# Patient Record
Sex: FEMALE | Race: BLACK OR AFRICAN AMERICAN | NOT HISPANIC OR LATINO | URBAN - METROPOLITAN AREA
[De-identification: names, ages, dates, MRNs, and addresses within clinical notes are randomized per-mention and may not be internally consistent; named-entity substitution may affect disease eponyms.]

---

## 2017-05-31 ENCOUNTER — EMERGENCY (EMERGENCY)
Facility: HOSPITAL | Age: 27
LOS: 1 days | Discharge: HOME | End: 2017-05-31
Attending: EMERGENCY MEDICINE | Admitting: EMERGENCY MEDICINE
Payer: MEDICAID

## 2017-05-31 VITALS
SYSTOLIC BLOOD PRESSURE: 130 MMHG | TEMPERATURE: 98 F | HEART RATE: 66 BPM | DIASTOLIC BLOOD PRESSURE: 71 MMHG | OXYGEN SATURATION: 100 % | RESPIRATION RATE: 15 BRPM | WEIGHT: 130.07 LBS

## 2017-05-31 LAB
APPEARANCE UR: SIGNIFICANT CHANGE UP
BACTERIA # UR AUTO: HIGH
BILIRUB UR-MCNC: NEGATIVE — SIGNIFICANT CHANGE UP
BLOOD UR QL VISUAL: NEGATIVE — SIGNIFICANT CHANGE UP
COLOR SPEC: SIGNIFICANT CHANGE UP
GLUCOSE UR-MCNC: NEGATIVE — SIGNIFICANT CHANGE UP
KETONES UR-MCNC: NEGATIVE — SIGNIFICANT CHANGE UP
LEUKOCYTE ESTERASE UR-ACNC: HIGH
NITRITE UR-MCNC: POSITIVE — HIGH
NON-SQ EPI CELLS # UR AUTO: <1 — SIGNIFICANT CHANGE UP
PH UR: 7.5 — SIGNIFICANT CHANGE UP (ref 4.6–8)
PROT UR-MCNC: 10 — SIGNIFICANT CHANGE UP
SP GR SPEC: 1.01 — SIGNIFICANT CHANGE UP (ref 1–1.03)
SQUAMOUS # UR AUTO: SIGNIFICANT CHANGE UP
UROBILINOGEN FLD QL: NORMAL E.U. — SIGNIFICANT CHANGE UP (ref 0.1–0.2)
WBC UR QL: SIGNIFICANT CHANGE UP (ref 0–?)

## 2017-05-31 PROCEDURE — 99284 EMERGENCY DEPT VISIT MOD MDM: CPT

## 2017-05-31 RX ORDER — CEFUROXIME AXETIL 250 MG
1 TABLET ORAL
Qty: 14 | Refills: 0
Start: 2017-05-31 | End: 2017-06-07

## 2017-05-31 RX ORDER — CEFUROXIME AXETIL 250 MG
1 TABLET ORAL
Qty: 14 | Refills: 0 | OUTPATIENT
Start: 2017-05-31 | End: 2017-06-07

## 2017-05-31 NOTE — ED PROVIDER NOTE - OBJECTIVE STATEMENT
27y F with no significant PMHx presents to the ED with urinary frequency for the past few days. Pt has hx of UTIs and states symptoms feel same. Pt c/o urinary frequency and odor. Reports feeling as though she has to urinate but only urinates a small amount. LMP: 4/15/17. Denies fever, chills, nausea, vomiting, diarrhea 27y F with no significant PMHx presents to the ED with urinary frequency for the past few days. Pt has hx of UTIs and states symptoms feel same. Pt c/o urinary frequency and odor. Reports feeling as though she has to urinate but only urinates a small amount. LMP: 4/15/17. Denies fever, chills, nausea, vomiting, diarrhea, or any other complaints.

## 2017-05-31 NOTE — ED PROVIDER NOTE - PROGRESS NOTE DETAILS
SURYA Lane pt urine + for leuks, nitrites. Dr. Barrera sent ceftin x 7 days to pharmacy will f/u culture in 48 hours. return precautions given. pt agrees and understands plan.

## 2017-05-31 NOTE — ED PROVIDER NOTE - CARE PLAN
Principal Discharge DX:	UTI (urinary tract infection)  Instructions for follow-up, activity and diet:	Rest, drink plenty of fluids.  Advance activity as tolerated.  Continue all previously prescribed medications as directed.  Follow up with your primary care physician in 48-72 hours- bring copies of your results. Take antibiotic Ceftin 250 mg twice a day for 7 days. Return to the Emergency Department for fevers,  worsening or persistent symptoms OR ANY NEW OR CONCERNING SYMPTOMS.

## 2017-05-31 NOTE — ED PROVIDER NOTE - PLAN OF CARE
Rest, drink plenty of fluids.  Advance activity as tolerated.  Continue all previously prescribed medications as directed.  Follow up with your primary care physician in 48-72 hours- bring copies of your results. Take antibiotic Ceftin 250 mg twice a day for 7 days. Return to the Emergency Department for fevers,  worsening or persistent symptoms OR ANY NEW OR CONCERNING SYMPTOMS.

## 2017-05-31 NOTE — ED PROVIDER NOTE - CONSTITUTIONAL, MLM
normal... Well appearing, well nourished, awake, alert, oriented to person, place, time/situation and in no apparent distress. Well appearing, NAD, on phone

## 2017-05-31 NOTE — ED PROVIDER NOTE - NS ED MD SCRIBE ATTENDING SCRIBE SECTIONS
HIV/PAST MEDICAL/SURGICAL/SOCIAL HISTORY/VITAL SIGNS( Pullset)/REVIEW OF SYSTEMS/HISTORY OF PRESENT ILLNESS/DISPOSITION

## 2017-05-31 NOTE — ED PROVIDER NOTE - MEDICAL DECISION MAKING DETAILS
27y F presents with UTI symptoms. 27y F presents with UTI symptoms. UA shows UTI. Will send home with Rx for Abx and f/u with PMD. Return for worsening symptoms. Pt appears well, unlikely pyelo.

## 2017-05-31 NOTE — ED PROVIDER NOTE - MUSCULOSKELETAL MINIMAL EXAM
tenderness to the paraspinal lumbar region with no signs of injury, no midline lumbar or t-spine tenderness with no stepoff and no palpable tenderness

## 2017-06-02 LAB — SPECIMEN SOURCE: SIGNIFICANT CHANGE UP

## 2017-06-03 LAB
-  AMIKACIN: SIGNIFICANT CHANGE UP
-  AMPICILLIN/SULBACTAM: SIGNIFICANT CHANGE UP
-  AMPICILLIN: SIGNIFICANT CHANGE UP
-  AZTREONAM: SIGNIFICANT CHANGE UP
-  CEFAZOLIN: SIGNIFICANT CHANGE UP
-  CEFEPIME: SIGNIFICANT CHANGE UP
-  CEFOXITIN: SIGNIFICANT CHANGE UP
-  CEFTAZIDIME: SIGNIFICANT CHANGE UP
-  CEFTRIAXONE: SIGNIFICANT CHANGE UP
-  CIPROFLOXACIN: SIGNIFICANT CHANGE UP
-  ERTAPENEM: SIGNIFICANT CHANGE UP
-  GENTAMICIN: SIGNIFICANT CHANGE UP
-  IMIPENEM: SIGNIFICANT CHANGE UP
-  LEVOFLOXACIN: SIGNIFICANT CHANGE UP
-  MEROPENEM: SIGNIFICANT CHANGE UP
-  NITROFURANTOIN: SIGNIFICANT CHANGE UP
-  PIPERACILLIN/TAZOBACTAM: SIGNIFICANT CHANGE UP
-  TIGECYCLINE: SIGNIFICANT CHANGE UP
-  TOBRAMYCIN: SIGNIFICANT CHANGE UP
-  TRIMETHOPRIM/SULFAMETHOXAZOLE: SIGNIFICANT CHANGE UP
BACTERIA UR CULT: SIGNIFICANT CHANGE UP
METHOD TYPE: SIGNIFICANT CHANGE UP
ORGANISM # SPEC MICROSCOPIC CNT: SIGNIFICANT CHANGE UP
ORGANISM # SPEC MICROSCOPIC CNT: SIGNIFICANT CHANGE UP

## 2017-06-14 ENCOUNTER — EMERGENCY (EMERGENCY)
Facility: HOSPITAL | Age: 27
LOS: 1 days | Discharge: HOME | End: 2017-06-14
Admitting: EMERGENCY MEDICINE
Payer: MEDICAID

## 2017-06-14 VITALS
SYSTOLIC BLOOD PRESSURE: 114 MMHG | DIASTOLIC BLOOD PRESSURE: 60 MMHG | RESPIRATION RATE: 18 BRPM | TEMPERATURE: 98 F | HEART RATE: 64 BPM | OXYGEN SATURATION: 100 %

## 2017-06-14 PROCEDURE — 99284 EMERGENCY DEPT VISIT MOD MDM: CPT

## 2017-06-14 RX ORDER — IBUPROFEN 200 MG
600 TABLET ORAL ONCE
Qty: 0 | Refills: 0 | Status: COMPLETED | OUTPATIENT
Start: 2017-06-14 | End: 2017-06-14

## 2017-06-14 RX ORDER — TETANUS TOXOID, REDUCED DIPHTHERIA TOXOID AND ACELLULAR PERTUSSIS VACCINE, ADSORBED 5; 2.5; 8; 8; 2.5 [IU]/.5ML; [IU]/.5ML; UG/.5ML; UG/.5ML; UG/.5ML
0.5 SUSPENSION INTRAMUSCULAR ONCE
Qty: 0 | Refills: 0 | Status: COMPLETED | OUTPATIENT
Start: 2017-06-14 | End: 2017-06-14

## 2017-06-14 RX ADMIN — TETANUS TOXOID, REDUCED DIPHTHERIA TOXOID AND ACELLULAR PERTUSSIS VACCINE, ADSORBED 0.5 MILLILITER(S): 5; 2.5; 8; 8; 2.5 SUSPENSION INTRAMUSCULAR at 10:26

## 2017-06-14 RX ADMIN — Medication 600 MILLIGRAM(S): at 10:27

## 2017-06-14 RX ADMIN — Medication 600 MILLIGRAM(S): at 10:25

## 2017-06-14 NOTE — PROVIDER CONTACT NOTE (OTHER) - ACTION/TREATMENT ORDERED:
VENTURA called pt's son, Quinten, [phone], and confirmed he will  father and bring clothes and shoes.

## 2017-06-14 NOTE — ED PROVIDER NOTE - PLAN OF CARE
Keep wound clean and dry - apply bacitracin to wound and cover after showering. Follow up with your primary care physician within 1 week for further evaluation. Return to the Emergency Department for any new, worsening or concerning symptoms.

## 2017-06-14 NOTE — ED ADULT TRIAGE NOTE - CHIEF COMPLAINT QUOTE
pt c/o left buttock laceration, while at work s/p sitting on a dirty broom. requesting to get checked for tetanus and bacitracin. denies medical history. denies any other symptoms

## 2017-06-14 NOTE — ED PROVIDER NOTE - CARE PLAN
Principal Discharge DX:	Abrasion  Instructions for follow-up, activity and diet:	Keep wound clean and dry - apply bacitracin to wound and cover after showering. Follow up with your primary care physician within 1 week for further evaluation. Return to the Emergency Department for any new, worsening or concerning symptoms.

## 2017-06-14 NOTE — ED PROVIDER NOTE - MEDICAL DECISION MAKING DETAILS
27 year old female with no pmhx pw abrasion to the left buttock s/p falling onto a broom earlier this am.   Plan: tdap, bacitracin

## 2017-06-14 NOTE — ED PROVIDER NOTE - OBJECTIVE STATEMENT
27 year old female with no pmhx pw abrasion to the left buttock s/p falling onto a broom earlier this am. Unsure of last tetanus vaccination. Denies n/v/f/c, cp, sob, abdominal pain, numbness/tingling/weakness is extremity.

## 2017-10-13 ENCOUNTER — APPOINTMENT (OUTPATIENT)
Dept: CV DIAGNOSITCS | Facility: HOSPITAL | Age: 27
End: 2017-10-13

## 2017-10-13 ENCOUNTER — OUTPATIENT (OUTPATIENT)
Dept: OUTPATIENT SERVICES | Facility: HOSPITAL | Age: 27
LOS: 1 days | End: 2017-10-13
Payer: MEDICAID

## 2017-10-13 DIAGNOSIS — R07.9 CHEST PAIN, UNSPECIFIED: ICD-10-CM

## 2017-10-13 PROCEDURE — 93306 TTE W/DOPPLER COMPLETE: CPT | Mod: 26

## 2018-02-08 ENCOUNTER — EMERGENCY (EMERGENCY)
Facility: HOSPITAL | Age: 28
LOS: 1 days | Discharge: HOME | End: 2018-02-08
Attending: EMERGENCY MEDICINE | Admitting: EMERGENCY MEDICINE
Payer: SELF-PAY

## 2018-02-08 VITALS
SYSTOLIC BLOOD PRESSURE: 127 MMHG | RESPIRATION RATE: 15 BRPM | DIASTOLIC BLOOD PRESSURE: 83 MMHG | OXYGEN SATURATION: 100 % | HEART RATE: 84 BPM | TEMPERATURE: 98 F

## 2018-02-08 PROCEDURE — 99283 EMERGENCY DEPT VISIT LOW MDM: CPT

## 2018-02-08 RX ORDER — DIPHENHYDRAMINE HCL 50 MG
50 CAPSULE ORAL ONCE
Qty: 0 | Refills: 0 | Status: COMPLETED | OUTPATIENT
Start: 2018-02-08 | End: 2018-02-08

## 2018-02-08 RX ADMIN — Medication 50 MILLIGRAM(S): at 22:45

## 2018-02-08 NOTE — ED ADULT TRIAGE NOTE - CHIEF COMPLAINT QUOTE
Pt c/o redness to bilateral lower legs and water filled sacs to hands. States " I had food from a restaurant on Monday- 10 minutes after eating my lip swelled up and I had diarrhea.", On Tuesday noted redness to legs and water filled sacs to hands" I went on my trip to [place] and returned today" Pt denies the use of any new skin products. No trouble breathing and skin is clear.

## 2018-02-08 NOTE — ED PROVIDER NOTE - OBJECTIVE STATEMENT
27F with no PMHx p/w blisters to hands and rash to lower legs. patient states she ate ox tail and rice on Monday, purchased Sunday and later that day noted blisters to the back of her hands, in between her legs and to her lower lip. the following day noted rash to lower legs, red and itchy, painful. patient took benadryl on Monday and Tuesday w/o relief. No hx of similar in the past, no family hx of any medical problems. Never had any sensation of difficulty swallowing or breathing.

## 2018-02-08 NOTE — ED ADULT NURSE NOTE - OBJECTIVE STATEMENT
Lexx RN:. 26 yo F received in spot 5.  Pt is A&Ox4.  Pt co blisters to hands and rash to lower legs.  States it started after eating ox tail and rice on Monday.  Denies any throat/tongue swelling, difficulty breathing/swallowing.  Patient took benadryl on Monday and Tuesday w/o relief.  meds as ordered. 18G R AC.

## 2018-02-08 NOTE — ED PROVIDER NOTE - ATTENDING CONTRIBUTION TO CARE
MD Gleason:  I performed a face to face bedside interview with patient regarding history of present illness, review of symptoms and past medical history. I completed an independent physical exam(documented below).  I have discussed patient's plan of care with resident.   I agree with note as stated above, having amended the EMR as needed to reflect my findings. I have discussed the assessment and plan of care.  This includes during the time I functioned as the attending physician for this patient.  PE:  Gen: Alert, NAD  Head: NC, AT,  EOMI, normal lids/conjunctiva  ENT:  normal hearing, patent oropharynx without erythema/exudate, uvula midline  Neck: +supple, no tenderness/meningismus/JVD, +Trachea midline  Chest: no chest wall tenderness, equal chest rise  Pulm: Bilateral BS, normal resp effort, no wheeze/stridor/retractions  CV: RRR, no M/R/G, +dist pulses  Abd: soft, NT/ND, +BS, no hepatosplenomegaly  Rectal: deferred  Mskel: no edema/erythema/cyanosis  Skin: non blanching erythematous rash throughout lower extremities. multiple tense bullae on b/l hands, crusting eczematous rash lower lip   Neuro: AAOx3  MDM:  28yo F, denies pmh, here for blisters/bullae, and non-blanching erythematous rash on LEs X 4 days, presentation most consistent w/ bullous pemphigoid. Labs, doxycycline, steroids, and dermatology f/u.

## 2018-02-08 NOTE — ED PROVIDER NOTE - MEDICAL DECISION MAKING DETAILS
27F with blistering rash, bullae and non-blanching rash to lower legs. will steroids and benadryl. check labs. will require outpt w/u, Rheum panel 27F with blistering rash, bullae and non-blanching rash to lower legs. suspect bullous pemphigoid. will steroids and benadryl. check labs. will require outpt w/u, Rheum panel

## 2018-02-09 LAB
ALBUMIN SERPL ELPH-MCNC: 4.5 G/DL — SIGNIFICANT CHANGE UP (ref 3.3–5)
ALP SERPL-CCNC: 58 U/L — SIGNIFICANT CHANGE UP (ref 40–120)
ALT FLD-CCNC: 13 U/L — SIGNIFICANT CHANGE UP (ref 4–33)
AST SERPL-CCNC: 17 U/L — SIGNIFICANT CHANGE UP (ref 4–32)
BASOPHILS # BLD AUTO: 0.02 K/UL — SIGNIFICANT CHANGE UP (ref 0–0.2)
BASOPHILS NFR BLD AUTO: 0.4 % — SIGNIFICANT CHANGE UP (ref 0–2)
BILIRUB SERPL-MCNC: < 0.2 MG/DL — LOW (ref 0.2–1.2)
BUN SERPL-MCNC: 15 MG/DL — SIGNIFICANT CHANGE UP (ref 7–23)
CALCIUM SERPL-MCNC: 9.3 MG/DL — SIGNIFICANT CHANGE UP (ref 8.4–10.5)
CHLORIDE SERPL-SCNC: 103 MMOL/L — SIGNIFICANT CHANGE UP (ref 98–107)
CO2 SERPL-SCNC: 26 MMOL/L — SIGNIFICANT CHANGE UP (ref 22–31)
CREAT SERPL-MCNC: 0.78 MG/DL — SIGNIFICANT CHANGE UP (ref 0.5–1.3)
EOSINOPHIL # BLD AUTO: 0.06 K/UL — SIGNIFICANT CHANGE UP (ref 0–0.5)
EOSINOPHIL NFR BLD AUTO: 1.1 % — SIGNIFICANT CHANGE UP (ref 0–6)
GLUCOSE SERPL-MCNC: 99 MG/DL — SIGNIFICANT CHANGE UP (ref 70–99)
HCT VFR BLD CALC: 39.9 % — SIGNIFICANT CHANGE UP (ref 34.5–45)
HGB BLD-MCNC: 12.3 G/DL — SIGNIFICANT CHANGE UP (ref 11.5–15.5)
IMM GRANULOCYTES # BLD AUTO: 0.01 # — SIGNIFICANT CHANGE UP
IMM GRANULOCYTES NFR BLD AUTO: 0.2 % — SIGNIFICANT CHANGE UP (ref 0–1.5)
LYMPHOCYTES # BLD AUTO: 1.78 K/UL — SIGNIFICANT CHANGE UP (ref 1–3.3)
LYMPHOCYTES # BLD AUTO: 33.3 % — SIGNIFICANT CHANGE UP (ref 13–44)
MCHC RBC-ENTMCNC: 26.2 PG — LOW (ref 27–34)
MCHC RBC-ENTMCNC: 30.8 % — LOW (ref 32–36)
MCV RBC AUTO: 84.9 FL — SIGNIFICANT CHANGE UP (ref 80–100)
MONOCYTES # BLD AUTO: 0.6 K/UL — SIGNIFICANT CHANGE UP (ref 0–0.9)
MONOCYTES NFR BLD AUTO: 11.2 % — SIGNIFICANT CHANGE UP (ref 2–14)
NEUTROPHILS # BLD AUTO: 2.87 K/UL — SIGNIFICANT CHANGE UP (ref 1.8–7.4)
NEUTROPHILS NFR BLD AUTO: 53.8 % — SIGNIFICANT CHANGE UP (ref 43–77)
NRBC # FLD: 0 — SIGNIFICANT CHANGE UP
PLATELET # BLD AUTO: 275 K/UL — SIGNIFICANT CHANGE UP (ref 150–400)
PMV BLD: 9.6 FL — SIGNIFICANT CHANGE UP (ref 7–13)
POTASSIUM SERPL-MCNC: 4.1 MMOL/L — SIGNIFICANT CHANGE UP (ref 3.5–5.3)
POTASSIUM SERPL-SCNC: 4.1 MMOL/L — SIGNIFICANT CHANGE UP (ref 3.5–5.3)
PROT SERPL-MCNC: 7.5 G/DL — SIGNIFICANT CHANGE UP (ref 6–8.3)
RBC # BLD: 4.7 M/UL — SIGNIFICANT CHANGE UP (ref 3.8–5.2)
RBC # FLD: 12.5 % — SIGNIFICANT CHANGE UP (ref 10.3–14.5)
SODIUM SERPL-SCNC: 141 MMOL/L — SIGNIFICANT CHANGE UP (ref 135–145)
WBC # BLD: 5.34 K/UL — SIGNIFICANT CHANGE UP (ref 3.8–10.5)
WBC # FLD AUTO: 5.34 K/UL — SIGNIFICANT CHANGE UP (ref 3.8–10.5)

## 2018-10-01 ENCOUNTER — EMERGENCY (EMERGENCY)
Facility: HOSPITAL | Age: 28
LOS: 1 days | Discharge: HOME | End: 2018-10-01
Attending: EMERGENCY MEDICINE | Admitting: EMERGENCY MEDICINE
Payer: COMMERCIAL

## 2018-10-01 VITALS
TEMPERATURE: 98 F | DIASTOLIC BLOOD PRESSURE: 75 MMHG | OXYGEN SATURATION: 100 % | RESPIRATION RATE: 18 BRPM | HEART RATE: 100 BPM | SYSTOLIC BLOOD PRESSURE: 114 MMHG

## 2018-10-01 PROCEDURE — 99283 EMERGENCY DEPT VISIT LOW MDM: CPT

## 2018-10-01 RX ORDER — DIPHENHYDRAMINE HCL 50 MG
50 CAPSULE ORAL ONCE
Qty: 0 | Refills: 0 | Status: COMPLETED | OUTPATIENT
Start: 2018-10-01 | End: 2018-10-01

## 2018-10-01 RX ORDER — ONDANSETRON 8 MG/1
4 TABLET, FILM COATED ORAL ONCE
Qty: 0 | Refills: 0 | Status: COMPLETED | OUTPATIENT
Start: 2018-10-01 | End: 2018-10-01

## 2018-10-01 RX ORDER — ACETAMINOPHEN 500 MG
975 TABLET ORAL ONCE
Qty: 0 | Refills: 0 | Status: COMPLETED | OUTPATIENT
Start: 2018-10-01 | End: 2018-10-01

## 2018-10-01 RX ADMIN — Medication 50 MILLIGRAM(S): at 08:47

## 2018-10-01 RX ADMIN — Medication 975 MILLIGRAM(S): at 09:20

## 2018-10-01 RX ADMIN — ONDANSETRON 4 MILLIGRAM(S): 8 TABLET, FILM COATED ORAL at 09:29

## 2018-10-01 RX ADMIN — Medication 975 MILLIGRAM(S): at 08:46

## 2018-10-01 NOTE — ED ADULT NURSE NOTE - OBJECTIVE STATEMENT
Pt received room 28.  AOX4, skin warm, and dry noted to have swelling and redness to hands and arms.  Pt endorses similar episode in the past.  Pt currently c/o nausea.  Medicated as per MAR.  Continue to monitor

## 2018-10-01 NOTE — ED PROVIDER NOTE - CONSTITUTIONAL, MLM
normal... Well appearing, well nourished, awake, alert, oriented to person, place, time/situation and in no apparent distress. Speaking full sentences, no respiratory distress.

## 2018-10-01 NOTE — ED PROVIDER NOTE - MEDICAL DECISION MAKING DETAILS
28 year old female with urticarial rash and vomiting x 1, treat with prednisone and benadryl for allergic reaction, tylenol for pain control. reassess. Crow: urticarial rash with out airway or oral involvement, prednisone and benadryl reassess.

## 2018-10-01 NOTE — ED PROVIDER NOTE - SKIN, MLM
Skin normal color for race, warm, dry and intact. (+) diffuse generalized urticarial rash, (+) mild tenderness to touch.

## 2018-10-01 NOTE — ED ADULT NURSE NOTE - CHIEF COMPLAINT QUOTE
Pt walk in c/o Pain on Both arm and Upper Leg, swollen started last Night worsening this Morning. Seen here last February, 2018, with the same complaints and treated like Allergic reaction. denies SOB throat is like closing. Pt is very uncomfortable/crying in Triage

## 2018-10-01 NOTE — ED PROVIDER NOTE - CROS ED GU ALL NEG
negative...
Haldol dec 50mg IM q 4 weeks, next due on 9/13/18  Haldol 2mg PO BID  Depakote 500mg po BID.

## 2018-10-01 NOTE — ED PROVIDER NOTE - OBJECTIVE STATEMENT
28 year old female presenting with urticarial rash since yesterday 4-5PM. Reports being at work when she began having diffuse erythema generalized throughout her body. She took benadryl with good relief of symptoms. This morning, she had worsening diffuse pain and persistent diffuse urticarial rash and comes to the ED for further evaluation. Associated with nausea/vomiting x 1 and 1 episode of diarrhea. Denies chest pain, SOB, abdominal pain, fevers, cough, or known allergies. Of note, patient has had this reaction before in the past, treated as an allergic reaction, but did not follow up with a allergist.

## 2018-10-01 NOTE — ED PROVIDER NOTE - PROGRESS NOTE DETAILS
Patient sleeping in stretcher, speaking full sentences, in NAD. (+) nausea. Treated with 4mg zofran PO. Patient sleeping in stretcher, reporting improved pain control, (-) n/v. Skin: (+) decreased mild urticarial rash diffusely. Patient in stretcher, speaking full sentences, SKIN: (+) urticarial rash improved, (+) itching. (-) n/v, (-) abdominal pain, hemodynamically stable, mentating well, ambulatory. Discussed need for further evaluation by allergist as outpatient, which patient verbalizes understanding and agreement. Discussed return precautions, home treatment, which patient verbalizes understanding.

## 2018-10-01 NOTE — ED ADULT TRIAGE NOTE - CHIEF COMPLAINT QUOTE
Pt walk in c/o Pain on Both arm and Upper Leg, swollen started last Night worsening this Morning. Seen here last February, 2018, with the same complaints and treated like Allergic reaction. denies SOB throat is like closing. Pt walk in c/o Pain on Both arm and Upper Leg, swollen started last Night worsening this Morning. Seen here last February, 2018, with the same complaints and treated like Allergic reaction. denies SOB throat is like closing. Pt is very uncomfortable/crying in Triage

## 2018-10-02 ENCOUNTER — EMERGENCY (EMERGENCY)
Facility: HOSPITAL | Age: 28
LOS: 1 days | Discharge: HOME | End: 2018-10-02
Attending: EMERGENCY MEDICINE | Admitting: EMERGENCY MEDICINE
Payer: COMMERCIAL

## 2018-10-02 VITALS
HEART RATE: 71 BPM | OXYGEN SATURATION: 100 % | DIASTOLIC BLOOD PRESSURE: 56 MMHG | SYSTOLIC BLOOD PRESSURE: 120 MMHG | RESPIRATION RATE: 18 BRPM

## 2018-10-02 VITALS
WEIGHT: 154.98 LBS | OXYGEN SATURATION: 97 % | RESPIRATION RATE: 20 BRPM | SYSTOLIC BLOOD PRESSURE: 103 MMHG | HEIGHT: 70 IN | HEART RATE: 71 BPM | DIASTOLIC BLOOD PRESSURE: 63 MMHG

## 2018-10-02 LAB
ALBUMIN SERPL ELPH-MCNC: 4.3 G/DL — SIGNIFICANT CHANGE UP (ref 3.3–5)
ALP SERPL-CCNC: 59 U/L — SIGNIFICANT CHANGE UP (ref 40–120)
ALT FLD-CCNC: 16 U/L — SIGNIFICANT CHANGE UP (ref 10–45)
ANION GAP SERPL CALC-SCNC: 12 MMOL/L — SIGNIFICANT CHANGE UP (ref 5–17)
AST SERPL-CCNC: 16 U/L — SIGNIFICANT CHANGE UP (ref 10–40)
BASOPHILS # BLD AUTO: 0 K/UL — SIGNIFICANT CHANGE UP (ref 0–0.2)
BASOPHILS NFR BLD AUTO: 0.2 % — SIGNIFICANT CHANGE UP (ref 0–2)
BILIRUB SERPL-MCNC: 0.1 MG/DL — LOW (ref 0.2–1.2)
BUN SERPL-MCNC: 12 MG/DL — SIGNIFICANT CHANGE UP (ref 7–23)
CALCIUM SERPL-MCNC: 9.4 MG/DL — SIGNIFICANT CHANGE UP (ref 8.4–10.5)
CHLORIDE SERPL-SCNC: 105 MMOL/L — SIGNIFICANT CHANGE UP (ref 96–108)
CO2 SERPL-SCNC: 22 MMOL/L — SIGNIFICANT CHANGE UP (ref 22–31)
CREAT SERPL-MCNC: 0.67 MG/DL — SIGNIFICANT CHANGE UP (ref 0.5–1.3)
EOSINOPHIL # BLD AUTO: 0 K/UL — SIGNIFICANT CHANGE UP (ref 0–0.5)
EOSINOPHIL NFR BLD AUTO: 0.1 % — SIGNIFICANT CHANGE UP (ref 0–6)
GLUCOSE SERPL-MCNC: 150 MG/DL — HIGH (ref 70–99)
HCT VFR BLD CALC: 38.5 % — SIGNIFICANT CHANGE UP (ref 34.5–45)
HGB BLD-MCNC: 12.4 G/DL — SIGNIFICANT CHANGE UP (ref 11.5–15.5)
LYMPHOCYTES # BLD AUTO: 0.7 K/UL — LOW (ref 1–3.3)
LYMPHOCYTES # BLD AUTO: 7.3 % — LOW (ref 13–44)
MCHC RBC-ENTMCNC: 27.2 PG — SIGNIFICANT CHANGE UP (ref 27–34)
MCHC RBC-ENTMCNC: 32.1 GM/DL — SIGNIFICANT CHANGE UP (ref 32–36)
MCV RBC AUTO: 84.5 FL — SIGNIFICANT CHANGE UP (ref 80–100)
MONOCYTES # BLD AUTO: 0.2 K/UL — SIGNIFICANT CHANGE UP (ref 0–0.9)
MONOCYTES NFR BLD AUTO: 2 % — SIGNIFICANT CHANGE UP (ref 2–14)
NEUTROPHILS # BLD AUTO: 8 K/UL — HIGH (ref 1.8–7.4)
NEUTROPHILS NFR BLD AUTO: 90.4 % — HIGH (ref 43–77)
PLATELET # BLD AUTO: 268 K/UL — SIGNIFICANT CHANGE UP (ref 150–400)
POTASSIUM SERPL-MCNC: 4.2 MMOL/L — SIGNIFICANT CHANGE UP (ref 3.5–5.3)
POTASSIUM SERPL-SCNC: 4.2 MMOL/L — SIGNIFICANT CHANGE UP (ref 3.5–5.3)
PROT SERPL-MCNC: 7.1 G/DL — SIGNIFICANT CHANGE UP (ref 6–8.3)
RBC # BLD: 4.55 M/UL — SIGNIFICANT CHANGE UP (ref 3.8–5.2)
RBC # FLD: 12.6 % — SIGNIFICANT CHANGE UP (ref 10.3–14.5)
SODIUM SERPL-SCNC: 139 MMOL/L — SIGNIFICANT CHANGE UP (ref 135–145)
WBC # BLD: 8.9 K/UL — SIGNIFICANT CHANGE UP (ref 3.8–10.5)
WBC # FLD AUTO: 8.9 K/UL — SIGNIFICANT CHANGE UP (ref 3.8–10.5)

## 2018-10-02 PROCEDURE — 80053 COMPREHEN METABOLIC PANEL: CPT

## 2018-10-02 PROCEDURE — 96374 THER/PROPH/DIAG INJ IV PUSH: CPT

## 2018-10-02 PROCEDURE — 99284 EMERGENCY DEPT VISIT MOD MDM: CPT | Mod: 25

## 2018-10-02 PROCEDURE — 99053 MED SERV 10PM-8AM 24 HR FAC: CPT

## 2018-10-02 PROCEDURE — 96375 TX/PRO/DX INJ NEW DRUG ADDON: CPT

## 2018-10-02 PROCEDURE — 85027 COMPLETE CBC AUTOMATED: CPT

## 2018-10-02 PROCEDURE — 96372 THER/PROPH/DIAG INJ SC/IM: CPT | Mod: XU

## 2018-10-02 RX ORDER — HYDROXYZINE HCL 10 MG
25 TABLET ORAL ONCE
Qty: 0 | Refills: 0 | Status: COMPLETED | OUTPATIENT
Start: 2018-10-02 | End: 2018-10-02

## 2018-10-02 RX ORDER — FAMOTIDINE 10 MG/ML
20 INJECTION INTRAVENOUS ONCE
Qty: 0 | Refills: 0 | Status: COMPLETED | OUTPATIENT
Start: 2018-10-02 | End: 2018-10-02

## 2018-10-02 RX ORDER — DEXAMETHASONE 0.5 MG/5ML
10 ELIXIR ORAL ONCE
Qty: 0 | Refills: 0 | Status: DISCONTINUED | OUTPATIENT
Start: 2018-10-02 | End: 2018-10-02

## 2018-10-02 RX ORDER — SODIUM CHLORIDE 9 MG/ML
1000 INJECTION INTRAMUSCULAR; INTRAVENOUS; SUBCUTANEOUS ONCE
Qty: 0 | Refills: 0 | Status: COMPLETED | OUTPATIENT
Start: 2018-10-02 | End: 2018-10-02

## 2018-10-02 RX ORDER — DEXAMETHASONE 0.5 MG/5ML
10 ELIXIR ORAL ONCE
Qty: 0 | Refills: 0 | Status: COMPLETED | OUTPATIENT
Start: 2018-10-02 | End: 2018-10-02

## 2018-10-02 RX ADMIN — Medication 102 MILLIGRAM(S): at 04:37

## 2018-10-02 RX ADMIN — FAMOTIDINE 20 MILLIGRAM(S): 10 INJECTION INTRAVENOUS at 04:26

## 2018-10-02 RX ADMIN — Medication 25 MILLIGRAM(S): at 04:37

## 2018-10-02 RX ADMIN — SODIUM CHLORIDE 1000 MILLILITER(S): 9 INJECTION INTRAMUSCULAR; INTRAVENOUS; SUBCUTANEOUS at 06:08

## 2018-10-02 NOTE — ED PROVIDER NOTE - CARE PLAN
Principal Discharge DX:	Allergic reaction, initial encounter  Assessment and plan of treatment:	1) You were seen in the ER for allergic reaction. The patient has been informed of all concerning signs and symptoms to return to Emergency Department, the necessity to follow up with PMD/Clinic/follow up provided within 2-3 days was explained, and the patient reports understanding of above with capacity and insight. You can look at the discharge papers for some examples of specific signs and symptoms to look out for.  2) Please follow up with Allergy and Immunology  3) Continue steroid and benadryl therapy

## 2018-10-02 NOTE — ED PROVIDER NOTE - OBJECTIVE STATEMENT
28F with no pmh presents with allergic reaction after eating chinese food on Sunday. Started as diffuse pruritis an hour after eating the chinese food,  hives appeared and then non-bloody diarrhea. Denies throat closing up, CP or SOB. [facility] where she received benadryl and 5 days of prednisone 50mg. Reaction improved at first with the first dose but itching and hives worsened when she went home. She took 3 more doses of 25mg benadryl and 1 more dose of 50mg prednisone without relief so she has now come back to ED. Denies abdominal pain, n/v, HA,  dizziness, vision changes, weakness. Denies drug use. LAst does benadryl was at 10pm yesterday.

## 2018-10-02 NOTE — ED ADULT TRIAGE NOTE - CHIEF COMPLAINT QUOTE
Allergic reaction "itchy all over" Seen by Md Clarke treated with benadryl & prednisone. PAtient claimed tonight itchy rash worsening even with benadryl.    denies throat discomfort.

## 2018-10-02 NOTE — ED ADULT NURSE NOTE - OBJECTIVE STATEMENT
29 yo presents to the ED, A&Ox4, ambulatory c/o Allergic reaction. pt states "itchy all over" since Sunday after she ate chinese food. pt reports that she was seen by a different ED and was treated with benadryl & prednisone. Patient claimed tonight itchy rash worsening even with benadryl. denies throat discomfort. pt reports similar episode happened in Feb 2018. pt states last benadryl dose was at 2200. VSS. lung sounds clear. speaking in full sentences without any difficulty.

## 2018-10-02 NOTE — ED PROVIDER NOTE - ATTENDING CONTRIBUTION TO CARE
27 yo female with allergic rxn x 1-2d after eating chinese food.  This has happened to her once previously about 9 months ago but she never followed up with allergy.  Seen at OSH for same yesterday, PO benadryl/prednisone without relief.  Multiple wheals on exam, pruritic.  No wheezing, no SOB, no stridor, otherwise nontoxic.  Will give pepcid, hydroxyzine, decadron here, reassess.

## 2018-10-02 NOTE — ED PROVIDER NOTE - PLAN OF CARE
1) You were seen in the ER for allergic reaction. The patient has been informed of all concerning signs and symptoms to return to Emergency Department, the necessity to follow up with PMD/Clinic/follow up provided within 2-3 days was explained, and the patient reports understanding of above with capacity and insight. You can look at the discharge papers for some examples of specific signs and symptoms to look out for.  2) Please follow up with Allergy and Immunology  3) Continue steroid and benadryl therapy

## 2018-10-02 NOTE — ED PROVIDER NOTE - PHYSICAL EXAMINATION
GEN: Well appearing, well nourished, in no apparent distress.  HEAD: NCAT  HEENT: PERRL, Airway patent, MMM,  neck supple, no LAD  LUNG: CTAB, no adventitious sounds, no retractions, no nasal flaring  CV: RRR, no murmurs,   Abd: soft, NTND, no rebound or guarding, BS+ in all quadrants, no CVAT  MSK: WWP, Pulses 2+ in extremities, No edema, no visible deformities  Neuro:  CN II-XII in tact. AAOx3, Ambulatory with stable gait.   Skin: Warm and dry, diffuse hives.   Psych: normal mood and affect

## 2018-10-02 NOTE — ED PROVIDER NOTE - MEDICAL DECISION MAKING DETAILS
Allergic reaction in young female unrelieved by benadryl and prednisone. consider alternative medications for pruritis.

## 2018-10-02 NOTE — ED PROVIDER NOTE - NS ED ROS FT
Constitutional: no fevers, no chills.  Eyes: no visual changes.  Ears: no ear drainage, no ear pain.  Nose: no nasal congestion.  Mouth/Throat: no sore throat.  Cardiovascular: no chest pain.  Respiratory: no shortness of breath, no wheezing, no cough  Gastrointestinal: no nausea, no vomiting, no diarrhea, no abdominal pain.  MSK: no flank pain, no back pain.  Genitourinary: no dysuria, no hematuria.  Skin: +hives +pruritis    Neuro: no headache,   Psychiatric: no known mental health issues.

## 2018-10-04 ENCOUNTER — LABORATORY RESULT (OUTPATIENT)
Age: 28
End: 2018-10-04

## 2018-10-04 ENCOUNTER — APPOINTMENT (OUTPATIENT)
Dept: PEDIATRIC ALLERGY IMMUNOLOGY | Facility: CLINIC | Age: 28
End: 2018-10-04
Payer: COMMERCIAL

## 2018-10-04 ENCOUNTER — APPOINTMENT (OUTPATIENT)
Dept: DERMATOLOGY | Facility: CLINIC | Age: 28
End: 2018-10-04
Payer: COMMERCIAL

## 2018-10-04 VITALS
OXYGEN SATURATION: 98 % | SYSTOLIC BLOOD PRESSURE: 116 MMHG | HEART RATE: 68 BPM | DIASTOLIC BLOOD PRESSURE: 71 MMHG | HEIGHT: 64 IN | WEIGHT: 169.13 LBS | BODY MASS INDEX: 28.88 KG/M2

## 2018-10-04 DIAGNOSIS — Z87.19 PERSONAL HISTORY OF OTHER DISEASES OF THE DIGESTIVE SYSTEM: ICD-10-CM

## 2018-10-04 DIAGNOSIS — F17.200 NICOTINE DEPENDENCE, UNSPECIFIED, UNCOMPLICATED: ICD-10-CM

## 2018-10-04 DIAGNOSIS — Z77.22 CONTACT WITH AND (SUSPECTED) EXPOSURE TO ENVIRONMENTAL TOBACCO SMOKE (ACUTE) (CHRONIC): ICD-10-CM

## 2018-10-04 DIAGNOSIS — S60.529A BLISTER (NONTHERMAL) OF UNSPECIFIED HAND, INITIAL ENCOUNTER: ICD-10-CM

## 2018-10-04 PROCEDURE — 99204 OFFICE O/P NEW MOD 45 MIN: CPT

## 2018-10-04 PROCEDURE — 36415 COLL VENOUS BLD VENIPUNCTURE: CPT

## 2018-10-04 PROCEDURE — 99203 OFFICE O/P NEW LOW 30 MIN: CPT

## 2018-10-04 RX ORDER — HYDROCORTISONE 25 MG/G
2.5 CREAM TOPICAL TWICE DAILY
Qty: 1 | Refills: 2 | Status: ACTIVE | COMMUNITY
Start: 2018-10-04 | End: 1900-01-01

## 2018-10-04 RX ORDER — CETIRIZINE HYDROCHLORIDE 10 MG/1
10 CAPSULE, LIQUID FILLED ORAL
Qty: 1 | Refills: 1 | Status: ACTIVE | COMMUNITY
Start: 2018-10-04 | End: 1900-01-01

## 2018-10-04 RX ORDER — TRIAMCINOLONE ACETONIDE 1 MG/G
0.1 OINTMENT TOPICAL TWICE DAILY
Qty: 1 | Refills: 1 | Status: ACTIVE | COMMUNITY
Start: 2018-10-04 | End: 1900-01-01

## 2018-10-08 LAB
ANA SER IF-ACNC: NEGATIVE
APPEARANCE: CLEAR
BILIRUBIN URINE: NEGATIVE
BLOOD URINE: NEGATIVE
COLOR: YELLOW
EOSINOPHIL # BLD MANUAL: 60 /UL
ERYTHROCYTE [SEDIMENTATION RATE] IN BLOOD BY WESTERGREN METHOD: 5 MM/HR
GLUCOSE QUALITATIVE U: NEGATIVE MG/DL
HBA1C MFR BLD HPLC: 5.5 %
HSV 1+2 IGG SER IA-IMP: NEGATIVE
HSV 1+2 IGG SER IA-IMP: POSITIVE
HSV1 IGG SER QL: 0.04 INDEX
HSV2 IGG SER QL: 8.21 INDEX
KETONES URINE: NEGATIVE
LEUKOCYTE ESTERASE URINE: ABNORMAL
M PNEUMO IGG SER IA-ACNC: POSITIVE
M PNEUMO IGG SER QL IA: 2.05 INDEX
M PNEUMO IGM SER QL IA: 758 UNITS/ML
MPO AB + PR3 PNL SER: NORMAL
MYCOPLASMA AG SPEC QL: NEGATIVE
NITRITE URINE: NEGATIVE
PH URINE: 6
PROTEIN URINE: NEGATIVE MG/DL
SPECIFIC GRAVITY URINE: 1.02
TOTAL IGE SMQN RAST: 68 KU/L
TRYPTASE: 2.9 NG/ML
UROBILINOGEN URINE: NEGATIVE MG/DL

## 2018-10-15 LAB
HSV1 IGM SER QL: NORMAL TITER
HSV2 AB FLD-ACNC: NORMAL TITER

## 2018-10-29 ENCOUNTER — RX RENEWAL (OUTPATIENT)
Age: 28
End: 2018-10-29

## 2018-10-29 RX ORDER — CETIRIZINE HYDROCHLORIDE 10 MG/1
10 TABLET, COATED ORAL
Qty: 1 | Refills: 1 | Status: ACTIVE | COMMUNITY
Start: 2018-10-29 | End: 1900-01-01

## 2018-11-06 ENCOUNTER — EMERGENCY (EMERGENCY)
Facility: HOSPITAL | Age: 28
LOS: 1 days | Discharge: HOME | End: 2018-11-06
Attending: EMERGENCY MEDICINE | Admitting: EMERGENCY MEDICINE
Payer: COMMERCIAL

## 2018-11-06 VITALS
TEMPERATURE: 98 F | RESPIRATION RATE: 18 BRPM | HEART RATE: 85 BPM | OXYGEN SATURATION: 98 % | SYSTOLIC BLOOD PRESSURE: 121 MMHG | DIASTOLIC BLOOD PRESSURE: 68 MMHG

## 2018-11-06 VITALS
HEART RATE: 67 BPM | DIASTOLIC BLOOD PRESSURE: 82 MMHG | OXYGEN SATURATION: 100 % | TEMPERATURE: 98 F | SYSTOLIC BLOOD PRESSURE: 104 MMHG | RESPIRATION RATE: 18 BRPM

## 2018-11-06 LAB
ALBUMIN SERPL ELPH-MCNC: 4.6 G/DL — SIGNIFICANT CHANGE UP (ref 3.3–5)
ALP SERPL-CCNC: 60 U/L — SIGNIFICANT CHANGE UP (ref 40–120)
ALT FLD-CCNC: 17 U/L — SIGNIFICANT CHANGE UP (ref 4–33)
AST SERPL-CCNC: 20 U/L — SIGNIFICANT CHANGE UP (ref 4–32)
BASOPHILS # BLD AUTO: 0.01 K/UL — SIGNIFICANT CHANGE UP (ref 0–0.2)
BASOPHILS NFR BLD AUTO: 0.1 % — SIGNIFICANT CHANGE UP (ref 0–2)
BASOPHILS NFR SPEC: 0 % — SIGNIFICANT CHANGE UP (ref 0–2)
BILIRUB SERPL-MCNC: 0.3 MG/DL — SIGNIFICANT CHANGE UP (ref 0.2–1.2)
BLASTS # FLD: 0 % — SIGNIFICANT CHANGE UP (ref 0–0)
BUN SERPL-MCNC: 10 MG/DL — SIGNIFICANT CHANGE UP (ref 7–23)
CALCIUM SERPL-MCNC: 9.5 MG/DL — SIGNIFICANT CHANGE UP (ref 8.4–10.5)
CHLORIDE SERPL-SCNC: 102 MMOL/L — SIGNIFICANT CHANGE UP (ref 98–107)
CO2 SERPL-SCNC: 23 MMOL/L — SIGNIFICANT CHANGE UP (ref 22–31)
CREAT SERPL-MCNC: 0.82 MG/DL — SIGNIFICANT CHANGE UP (ref 0.5–1.3)
EOSINOPHIL # BLD AUTO: 0.03 K/UL — SIGNIFICANT CHANGE UP (ref 0–0.5)
EOSINOPHIL NFR BLD AUTO: 0.2 % — SIGNIFICANT CHANGE UP (ref 0–6)
EOSINOPHIL NFR FLD: 0 % — SIGNIFICANT CHANGE UP (ref 0–6)
GIANT PLATELETS BLD QL SMEAR: PRESENT — SIGNIFICANT CHANGE UP
GLUCOSE SERPL-MCNC: 98 MG/DL — SIGNIFICANT CHANGE UP (ref 70–99)
HCT VFR BLD CALC: 41.5 % — SIGNIFICANT CHANGE UP (ref 34.5–45)
HGB BLD-MCNC: 12.9 G/DL — SIGNIFICANT CHANGE UP (ref 11.5–15.5)
IMM GRANULOCYTES # BLD AUTO: 0.05 # — SIGNIFICANT CHANGE UP
IMM GRANULOCYTES NFR BLD AUTO: 0.4 % — SIGNIFICANT CHANGE UP (ref 0–1.5)
LYMPHOCYTES # BLD AUTO: 0.29 K/UL — LOW (ref 1–3.3)
LYMPHOCYTES # BLD AUTO: 2.4 % — LOW (ref 13–44)
LYMPHOCYTES NFR SPEC AUTO: 0 % — LOW (ref 13–44)
MCHC RBC-ENTMCNC: 27 PG — SIGNIFICANT CHANGE UP (ref 27–34)
MCHC RBC-ENTMCNC: 31.1 % — LOW (ref 32–36)
MCV RBC AUTO: 87 FL — SIGNIFICANT CHANGE UP (ref 80–100)
METAMYELOCYTES # FLD: 0 % — SIGNIFICANT CHANGE UP (ref 0–1)
MONOCYTES # BLD AUTO: 0.31 K/UL — SIGNIFICANT CHANGE UP (ref 0–0.9)
MONOCYTES NFR BLD AUTO: 2.5 % — SIGNIFICANT CHANGE UP (ref 2–14)
MONOCYTES NFR BLD: 0.9 % — LOW (ref 2–9)
MORPHOLOGY BLD-IMP: NORMAL — SIGNIFICANT CHANGE UP
MYELOCYTES NFR BLD: 0 % — SIGNIFICANT CHANGE UP (ref 0–0)
NEUTROPHIL AB SER-ACNC: 89.4 % — HIGH (ref 43–77)
NEUTROPHILS # BLD AUTO: 11.58 K/UL — HIGH (ref 1.8–7.4)
NEUTROPHILS NFR BLD AUTO: 94.4 % — HIGH (ref 43–77)
NEUTS BAND # BLD: 9.7 % — HIGH (ref 0–6)
NRBC # FLD: 0 — SIGNIFICANT CHANGE UP
OTHER - HEMATOLOGY %: 0 — SIGNIFICANT CHANGE UP
PLATELET # BLD AUTO: 298 K/UL — SIGNIFICANT CHANGE UP (ref 150–400)
PLATELET COUNT - ESTIMATE: NORMAL — SIGNIFICANT CHANGE UP
PMV BLD: 10.1 FL — SIGNIFICANT CHANGE UP (ref 7–13)
POTASSIUM SERPL-MCNC: 4.1 MMOL/L — SIGNIFICANT CHANGE UP (ref 3.5–5.3)
POTASSIUM SERPL-SCNC: 4.1 MMOL/L — SIGNIFICANT CHANGE UP (ref 3.5–5.3)
PROMYELOCYTES # FLD: 0 % — SIGNIFICANT CHANGE UP (ref 0–0)
PROT SERPL-MCNC: 7.9 G/DL — SIGNIFICANT CHANGE UP (ref 6–8.3)
RBC # BLD: 4.77 M/UL — SIGNIFICANT CHANGE UP (ref 3.8–5.2)
RBC # FLD: 13.2 % — SIGNIFICANT CHANGE UP (ref 10.3–14.5)
SODIUM SERPL-SCNC: 140 MMOL/L — SIGNIFICANT CHANGE UP (ref 135–145)
VARIANT LYMPHS # BLD: 0 % — SIGNIFICANT CHANGE UP
WBC # BLD: 12.27 K/UL — HIGH (ref 3.8–10.5)
WBC # FLD AUTO: 12.27 K/UL — HIGH (ref 3.8–10.5)

## 2018-11-06 PROCEDURE — 99283 EMERGENCY DEPT VISIT LOW MDM: CPT

## 2018-11-06 RX ORDER — DIAZEPAM 5 MG
1 TABLET ORAL
Qty: 8 | Refills: 0
Start: 2018-11-06

## 2018-11-06 RX ORDER — DIPHENHYDRAMINE HCL 50 MG
50 CAPSULE ORAL ONCE
Qty: 0 | Refills: 0 | Status: COMPLETED | OUTPATIENT
Start: 2018-11-06 | End: 2018-11-06

## 2018-11-06 RX ORDER — HYDROXYZINE HCL 10 MG
1 TABLET ORAL
Qty: 24 | Refills: 0
Start: 2018-11-06

## 2018-11-06 RX ORDER — HYDROXYZINE HCL 10 MG
50 TABLET ORAL ONCE
Qty: 0 | Refills: 0 | Status: COMPLETED | OUTPATIENT
Start: 2018-11-06 | End: 2018-11-06

## 2018-11-06 RX ORDER — FAMOTIDINE 10 MG/ML
20 INJECTION INTRAVENOUS ONCE
Qty: 0 | Refills: 0 | Status: COMPLETED | OUTPATIENT
Start: 2018-11-06 | End: 2018-11-06

## 2018-11-06 RX ADMIN — Medication 125 MILLIGRAM(S): at 13:30

## 2018-11-06 RX ADMIN — Medication 50 MILLIGRAM(S): at 14:19

## 2018-11-06 RX ADMIN — Medication 102 MILLIGRAM(S): at 13:30

## 2018-11-06 RX ADMIN — FAMOTIDINE 20 MILLIGRAM(S): 10 INJECTION INTRAVENOUS at 14:18

## 2018-11-06 NOTE — ED PROVIDER NOTE - SKIN RASH DESCRIPTION
Generalized rash. Erythematous papules consistent with urticaria to arms and legs. No mucosal involvement. No eye involvement

## 2018-11-06 NOTE — ED PROVIDER NOTE - MEDICAL DECISION MAKING DETAILS
28y F with generalized rash and itchiness. Likely urticaria/allergic rx. Will check labs, and give steroids and antihistamines

## 2018-11-06 NOTE — ED PROVIDER NOTE - OBJECTIVE STATEMENT
28y F with no significant PMHx presents to the ED with rash and itchiness x2 days. Pt states starting yesterday 15 minutes after taking Aleve started having generalized itchiness and rash. Denies SOB, mucosal involvement of rash, nausea, vomiting, CP, or abd pain. Pt states having previous episode of generalized rash and has been worked up by an allergist. Was told rash was due to some oil. Pt states has not eaten out since. Denies new soaps, lotions, or detergents

## 2018-11-06 NOTE — ED ADULT TRIAGE NOTE - CHIEF COMPLAINT QUOTE
p/t c/o of hives all over the body since yesterday, denies any airway issues, p/t had same episode one month ago lasted for a week

## 2018-11-06 NOTE — ED PROVIDER NOTE - PROGRESS NOTE DETAILS
Pt states feeling better. Rash and itchiness improved. Sent Hydroxyzine, Prednisone, and Valium to pharmacy. Pt has f/u with Allergist.

## 2018-11-06 NOTE — ED ADULT NURSE REASSESSMENT NOTE - SYMPTOMS
c.o. hives and itching all over body, denies difficulty breathing or scratchy throat. respirations even and  unlabored. denies any possibility of pregnancy, pt currently menstruating. respirations even and unlabored. sl placed medicated as ordered. c.o. hives and itching all over body, denies difficulty breathing or scratchy throat. respirations even and  unlabored. denies any possibility of pregnancy, pt currently menstruating.  sl placed medicated as ordered.

## 2018-11-28 ENCOUNTER — APPOINTMENT (OUTPATIENT)
Dept: PEDIATRIC ALLERGY IMMUNOLOGY | Facility: CLINIC | Age: 28
End: 2018-11-28
Payer: COMMERCIAL

## 2018-11-28 VITALS
WEIGHT: 170.99 LBS | SYSTOLIC BLOOD PRESSURE: 129 MMHG | BODY MASS INDEX: 29.35 KG/M2 | HEART RATE: 93 BPM | DIASTOLIC BLOOD PRESSURE: 87 MMHG

## 2018-11-28 DIAGNOSIS — J30.81 ALLERGIC RHINITIS DUE TO ANIMAL (CAT) (DOG) HAIR AND DANDER: ICD-10-CM

## 2018-11-28 DIAGNOSIS — T78.40XA ALLERGY, UNSPECIFIED, INITIAL ENCOUNTER: ICD-10-CM

## 2018-11-28 DIAGNOSIS — R21 RASH AND OTHER NONSPECIFIC SKIN ERUPTION: ICD-10-CM

## 2018-11-28 DIAGNOSIS — Z88.6 ALLERGY STATUS TO ANALGESIC AGENT: ICD-10-CM

## 2018-11-28 DIAGNOSIS — Z91.038 OTHER INSECT ALLERGY STATUS: ICD-10-CM

## 2018-11-28 PROCEDURE — 99214 OFFICE O/P EST MOD 30 MIN: CPT | Mod: 25

## 2018-11-28 PROCEDURE — 95004 PERQ TESTS W/ALRGNC XTRCS: CPT

## 2018-11-30 PROBLEM — Z88.6 ALLERGY TO NSAIDS: Status: ACTIVE | Noted: 2018-11-30

## 2018-11-30 PROBLEM — J30.81 ALLERGY TO CATS: Status: ACTIVE | Noted: 2018-11-30

## 2018-11-30 PROBLEM — T78.40XA ACUTE ALLERGIC REACTION, INITIAL ENCOUNTER: Status: ACTIVE | Noted: 2018-10-04

## 2018-11-30 PROBLEM — Z91.038 ALLERGY TO COCKROACHES: Status: ACTIVE | Noted: 2018-11-30

## 2019-10-11 ENCOUNTER — INPATIENT (INPATIENT)
Facility: HOSPITAL | Age: 29
LOS: 2 days | Discharge: HOME | End: 2019-10-14
Attending: OTOLARYNGOLOGY | Admitting: OTOLARYNGOLOGY
Payer: SELF-PAY

## 2019-10-11 VITALS
HEART RATE: 80 BPM | OXYGEN SATURATION: 100 % | DIASTOLIC BLOOD PRESSURE: 87 MMHG | RESPIRATION RATE: 16 BRPM | SYSTOLIC BLOOD PRESSURE: 127 MMHG | TEMPERATURE: 99 F

## 2019-10-11 DIAGNOSIS — J05.10 ACUTE EPIGLOTTITIS WITHOUT OBSTRUCTION: ICD-10-CM

## 2019-10-11 LAB
ALBUMIN SERPL ELPH-MCNC: 5.2 G/DL — HIGH (ref 3.3–5)
ALP SERPL-CCNC: 70 U/L — SIGNIFICANT CHANGE UP (ref 40–120)
ALT FLD-CCNC: 10 U/L — SIGNIFICANT CHANGE UP (ref 4–33)
ANION GAP SERPL CALC-SCNC: 17 MMO/L — HIGH (ref 7–14)
AST SERPL-CCNC: 19 U/L — SIGNIFICANT CHANGE UP (ref 4–32)
BASOPHILS # BLD AUTO: 0.03 K/UL — SIGNIFICANT CHANGE UP (ref 0–0.2)
BASOPHILS NFR BLD AUTO: 0.3 % — SIGNIFICANT CHANGE UP (ref 0–2)
BILIRUB SERPL-MCNC: 0.5 MG/DL — SIGNIFICANT CHANGE UP (ref 0.2–1.2)
BUN SERPL-MCNC: 11 MG/DL — SIGNIFICANT CHANGE UP (ref 7–23)
CALCIUM SERPL-MCNC: 10.3 MG/DL — SIGNIFICANT CHANGE UP (ref 8.4–10.5)
CHLORIDE SERPL-SCNC: 99 MMOL/L — SIGNIFICANT CHANGE UP (ref 98–107)
CO2 SERPL-SCNC: 22 MMOL/L — SIGNIFICANT CHANGE UP (ref 22–31)
CREAT SERPL-MCNC: 0.84 MG/DL — SIGNIFICANT CHANGE UP (ref 0.5–1.3)
EOSINOPHIL # BLD AUTO: 0.01 K/UL — SIGNIFICANT CHANGE UP (ref 0–0.5)
EOSINOPHIL NFR BLD AUTO: 0.1 % — SIGNIFICANT CHANGE UP (ref 0–6)
GLUCOSE SERPL-MCNC: 94 MG/DL — SIGNIFICANT CHANGE UP (ref 70–99)
HCT VFR BLD CALC: 42.2 % — SIGNIFICANT CHANGE UP (ref 34.5–45)
HGB BLD-MCNC: 13.3 G/DL — SIGNIFICANT CHANGE UP (ref 11.5–15.5)
IMM GRANULOCYTES NFR BLD AUTO: 0.3 % — SIGNIFICANT CHANGE UP (ref 0–1.5)
LYMPHOCYTES # BLD AUTO: 1.44 K/UL — SIGNIFICANT CHANGE UP (ref 1–3.3)
LYMPHOCYTES # BLD AUTO: 12.9 % — LOW (ref 13–44)
MCHC RBC-ENTMCNC: 26.9 PG — LOW (ref 27–34)
MCHC RBC-ENTMCNC: 31.5 % — LOW (ref 32–36)
MCV RBC AUTO: 85.3 FL — SIGNIFICANT CHANGE UP (ref 80–100)
MONOCYTES # BLD AUTO: 0.84 K/UL — SIGNIFICANT CHANGE UP (ref 0–0.9)
MONOCYTES NFR BLD AUTO: 7.5 % — SIGNIFICANT CHANGE UP (ref 2–14)
NEUTROPHILS # BLD AUTO: 8.82 K/UL — HIGH (ref 1.8–7.4)
NEUTROPHILS NFR BLD AUTO: 78.9 % — HIGH (ref 43–77)
NRBC # FLD: 0 K/UL — SIGNIFICANT CHANGE UP (ref 0–0)
PLATELET # BLD AUTO: 354 K/UL — SIGNIFICANT CHANGE UP (ref 150–400)
PMV BLD: 9.1 FL — SIGNIFICANT CHANGE UP (ref 7–13)
POTASSIUM SERPL-MCNC: 3.7 MMOL/L — SIGNIFICANT CHANGE UP (ref 3.5–5.3)
POTASSIUM SERPL-SCNC: 3.7 MMOL/L — SIGNIFICANT CHANGE UP (ref 3.5–5.3)
PROT SERPL-MCNC: 8.5 G/DL — HIGH (ref 6–8.3)
RBC # BLD: 4.95 M/UL — SIGNIFICANT CHANGE UP (ref 3.8–5.2)
RBC # FLD: 13.1 % — SIGNIFICANT CHANGE UP (ref 10.3–14.5)
SODIUM SERPL-SCNC: 138 MMOL/L — SIGNIFICANT CHANGE UP (ref 135–145)
WBC # BLD: 11.17 K/UL — HIGH (ref 3.8–10.5)
WBC # FLD AUTO: 11.17 K/UL — HIGH (ref 3.8–10.5)

## 2019-10-11 PROCEDURE — 99233 SBSQ HOSP IP/OBS HIGH 50: CPT | Mod: GC

## 2019-10-11 PROCEDURE — 71045 X-RAY EXAM CHEST 1 VIEW: CPT | Mod: 26

## 2019-10-11 PROCEDURE — 70360 X-RAY EXAM OF NECK: CPT | Mod: 26

## 2019-10-11 RX ORDER — DEXAMETHASONE 0.5 MG/5ML
9 ELIXIR ORAL ONCE
Refills: 0 | Status: COMPLETED | OUTPATIENT
Start: 2019-10-11 | End: 2019-10-11

## 2019-10-11 RX ORDER — CEFTRIAXONE 500 MG/1
1000 INJECTION, POWDER, FOR SOLUTION INTRAMUSCULAR; INTRAVENOUS ONCE
Refills: 0 | Status: COMPLETED | OUTPATIENT
Start: 2019-10-11 | End: 2019-10-11

## 2019-10-11 RX ORDER — HEPARIN SODIUM 5000 [USP'U]/ML
5000 INJECTION INTRAVENOUS; SUBCUTANEOUS EVERY 8 HOURS
Refills: 0 | Status: DISCONTINUED | OUTPATIENT
Start: 2019-10-11 | End: 2019-10-14

## 2019-10-11 RX ORDER — MORPHINE SULFATE 50 MG/1
4 CAPSULE, EXTENDED RELEASE ORAL ONCE
Refills: 0 | Status: DISCONTINUED | OUTPATIENT
Start: 2019-10-11 | End: 2019-10-11

## 2019-10-11 RX ORDER — DEXAMETHASONE 0.5 MG/5ML
10 ELIXIR ORAL EVERY 6 HOURS
Refills: 0 | Status: DISCONTINUED | OUTPATIENT
Start: 2019-10-11 | End: 2019-10-11

## 2019-10-11 RX ORDER — SODIUM CHLORIDE 9 MG/ML
1000 INJECTION, SOLUTION INTRAVENOUS
Refills: 0 | Status: DISCONTINUED | OUTPATIENT
Start: 2019-10-11 | End: 2019-10-13

## 2019-10-11 RX ORDER — DEXAMETHASONE 0.5 MG/5ML
10 ELIXIR ORAL EVERY 6 HOURS
Refills: 0 | Status: DISCONTINUED | OUTPATIENT
Start: 2019-10-11 | End: 2019-10-13

## 2019-10-11 RX ORDER — SODIUM CHLORIDE 9 MG/ML
1000 INJECTION INTRAMUSCULAR; INTRAVENOUS; SUBCUTANEOUS ONCE
Refills: 0 | Status: COMPLETED | OUTPATIENT
Start: 2019-10-11 | End: 2019-10-11

## 2019-10-11 RX ORDER — VANCOMYCIN HCL 1 G
1000 VIAL (EA) INTRAVENOUS ONCE
Refills: 0 | Status: COMPLETED | OUTPATIENT
Start: 2019-10-11 | End: 2019-10-11

## 2019-10-11 RX ORDER — VANCOMYCIN HCL 1 G
1000 VIAL (EA) INTRAVENOUS EVERY 12 HOURS
Refills: 0 | Status: DISCONTINUED | OUTPATIENT
Start: 2019-10-12 | End: 2019-10-13

## 2019-10-11 RX ORDER — ACETAMINOPHEN 500 MG
650 TABLET ORAL ONCE
Refills: 0 | Status: COMPLETED | OUTPATIENT
Start: 2019-10-11 | End: 2019-10-11

## 2019-10-11 RX ORDER — CEFTRIAXONE 500 MG/1
1000 INJECTION, POWDER, FOR SOLUTION INTRAMUSCULAR; INTRAVENOUS EVERY 24 HOURS
Refills: 0 | Status: DISCONTINUED | OUTPATIENT
Start: 2019-10-12 | End: 2019-10-14

## 2019-10-11 RX ORDER — DIPHENHYDRAMINE HCL 50 MG
25 CAPSULE ORAL ONCE
Refills: 0 | Status: COMPLETED | OUTPATIENT
Start: 2019-10-11 | End: 2019-10-11

## 2019-10-11 RX ADMIN — MORPHINE SULFATE 4 MILLIGRAM(S): 50 CAPSULE, EXTENDED RELEASE ORAL at 18:46

## 2019-10-11 RX ADMIN — CEFTRIAXONE 100 MILLIGRAM(S): 500 INJECTION, POWDER, FOR SOLUTION INTRAMUSCULAR; INTRAVENOUS at 18:05

## 2019-10-11 RX ADMIN — Medication 101.8 MILLIGRAM(S): at 17:20

## 2019-10-11 RX ADMIN — Medication 250 MILLIGRAM(S): at 18:46

## 2019-10-11 RX ADMIN — SODIUM CHLORIDE 1000 MILLILITER(S): 9 INJECTION INTRAMUSCULAR; INTRAVENOUS; SUBCUTANEOUS at 17:20

## 2019-10-11 NOTE — CONSULT NOTE ADULT - SUBJECTIVE AND OBJECTIVE BOX
SICU Consultation Note  =====================================================      HPI: 28yo F with no known PMH who presents with 1 day of sore throat. This morning woke up with sore throat, tried cepacol drops but throughout the day progressed to difficulty tolerating secretions, unable to take any fluids or PO and hoarse voice. She denies difficulty breathing at any point. She does not have any allergies, no history of angioedema. Reports subjective fever this AM, temperature 99.3 in ED. On admission, given 1 dose decadron and taken to CXR which showed possible epiglottitis, ENT called for airway evaluation and scope demonstrated edematous epiglottis and secretions. SICU was consulted for airway monitoring.       Allergies:   PAST MEDICAL & SURGICAL HISTORY:  No pertinent past medical history  No significant past medical history  No significant past surgical history  No significant past surgical history    FAMILY HISTORY:  No pertinent family history in first degree relatives  No pertinent family history in first degree relatives      SOCIAL HISTORY:  ***    ADVANCE DIRECTIVES: Presumed Full Code  ***    REVIEW OF SYSTEMS:  ***  General: Non-Contributory  Skin/Breast: Non-Contributory  Ophthalmologic: Non-Contributory  ENMT: Non-Contributory  Respiratory and Thorax: Non-Contributory  Cardiovascular: Non-Contributory  Gastrointestinal: Non-Contributory  Genitourinary: Non-Contributory  Musculoskeletal: Non-Contributory  Neurological: Non-Contributory  Psychiatric: Non-Contributory  Hematology/Lymphatics: Non-Contributory  Endocrine: Non-Contributory  Allergic/Immunologic: Non-Contributory    HOME MEDICATIONS:      CURRENT MEDICATIONS:   --------------------------------------------------------------------------------------  Neurologic Medications    Respiratory Medications    Cardiovascular Medications    Gastrointestinal Medications    Genitourinary Medications    Hematologic/Oncologic Medications    Antimicrobial/Immunologic Medications    Endocrine/Metabolic Medications    Topical/Other Medications    --------------------------------------------------------------------------------------    VITAL SIGNS, INS/OUTS (last 24 hours):  --------------------------------------------------------------------------------------  T(C): 37.4 (10-11-19 @ 15:14), Max: 37.4 (10-11-19 @ 15:14)  HR: 85 (10-11-19 @ 18:36) (80 - 85)  BP: 137/77 (10-11-19 @ 18:36) (127/87 - 137/77)  ABP: --  ABP(mean): --  RR: 16 (10-11-19 @ 18:36) (16 - 16)  SpO2: 100% (10-11-19 @ 18:36) (100% - 100%)  Wt(kg): --  CVP(mm Hg): --      --------------------------------------------------------------------------------------    EXAM  NEUROLOGY  Exam: Normal, NAD, alert, oriented x 3, no focal deficits.     HEENT  Exam: Normocephalic, atraumatic.  EOMI. Mild hoarseness with speech.    RESPIRATORY  Exam: Lungs clear to auscultation, Normal expansion/effort.      CARDIOVASCULAR  Exam: S1, S2.  Regular rate and rhythm.  Peripheral edema      GI/NUTRITION  Exam: Abdomen soft, Non-tender, Non-distended.    Current Diet:  NPO     VASCULAR  Exam: Extremities warm, pink, well-perfused.     MUSCULOSKELETAL  Exam: All extremities moving spontaneously without limitations.      SKIN:  Exam: Good skin turgor, no skin breakdown.      METABOLIC/FLUIDS/ELECTROLYTES      HEMATOLOGIC  [x] DVT Prophylaxis:   Transfusions:	[] PRBC	[] Platelets		[] FFP	[] Cryoprecipitate    INFECTIOUS DISEASE  Antimicrobials/Immunologic Medications:    Tubes/Lines/Drains  ***  [x] Peripheral IV  [] Central Venous Line     	[] R	[] L	[] IJ	[] Fem	[] SC	Date Placed:   [] Arterial Line		[] R	[] L	[] Fem	[] Rad	[] Ax	Date Placed:   [] PICC:         	[] Midline		[] Mediport  [] Urinary Catheter		Date Placed:     LABS  --------------------------------------------------------------------------------------  CBC (10-11 @ 17:19)                              13.3                           11.17<H>  )----------------(  354        78.9<H>% Neutrophils, 12.9<L>% Lymphocytes, ANC: 8.82<H>                              42.2                  BMP (10-11 @ 17:19)             138     |  99      |  11    		Ca++ --      Ca 10.3               ---------------------------------( 94    		Mg --                 3.7     |  22      |  0.84  			Ph --        LFTs (10-11 @ 17:19)      TPro 8.5<H> / Alb 5.2<H> / TBili 0.5 / DBili -- / AST 19 / ALT 10 / AlkPhos 70            --------------------------------------------------------------------------------------    OTHER LABS    IMAGING RESULTS  Echo:   CT:   Xray:     ASSESSMENT:  29y Female presenting with epiglottitis	    PLAN:      Neurologic:   - No active issues    Respiratory:   - Continue decadron   - Monitor respiratory status - if any decline, will require nasotracheal fiberoptic intubation    Cardiovascular:   - No active issues    Gastrointestinal/Nutrition:   - No active issues    Renal/Genitourinary:   - No active issues    Hematologic:   - No active issues    Infectious Disease:   - Continue Ceftriaxone and Vancomycin    Tubes/Lines/Drains:   - PIV    Endocrine:   - No active issues    Disposition:   - SICU    --------------------------------------------------------------------------------------    Critical Care Diagnoses:
HPI: 30yo F with no known PMH who presents with 1 day of sore throat. This morning woke up with sore throat, tried cepacol drops but throughout the day progressed to difficulty tolerating secretions, unable to take any fluids or PO and hoarse voice. She denies difficulty breathing at any point. She does not have any allergies, no history of angioedema. Reports subjective fever this AM, temperature 99.3 in ED. On admission, given 1 dose decadron and taken to CXR which showed possible epiglottitis, ENT called for airway evaluation.     PMH: none known  Meds: none    T(C): 37.4 (10-11-19 @ 15:14), Max: 37.4 (10-11-19 @ 15:14)  HR: 80 (10-11-19 @ 15:14) (80 - 80)  BP: 127/87 (10-11-19 @ 15:14) (127/87 - 127/87)  RR: 16 (10-11-19 @ 15:14) (16 - 16)  SpO2: 100% (10-11-19 @ 15:14) (100% - 100%)  Appears fatigued and uncomfortably  Breathing comfortably on RA, no stridor, no stertor  Face symmetric  EOMI  NC clear anteriorly  OC tongue wnl, soft palate flat, no edema, bl tonsils 2+ no purulence, posterior OP clear, spitting secretions, not swallowing  Voice muffled  Neck soft, flat, ELIU    Scope:  NC/NP wnl  OC/OP wnl  BOT fullness  Epiglottis with watery edema and erythema with obstruction of the supraglottis  Able to pass scope posteriorly between posterior pharyngeal and epiglottitis (~5mm) with visualization of glottis  R AE fold watery edema, L wnl  Bilateral TVC well visualized no edema    A/P: 30yo F with epiglottitis and epiglottic edema. Patient is stable on RA, will hold off on intubation at this time. Will rescope in 1 hour. If any sign of acute decompensation, call ENT stat, would need awake fiberoptic intubation.  - IV abx ctx/vanc per ID  - NPO  - IVF  - blood cultures, labs  - decadron 10mg q6h  - ENT will rescope at 7p  - call ENT stat for any acute change, would need AWAKE nasotracheal intubation  - admit to MICU for airway monitoring  - discussed with Dr. Wilson

## 2019-10-11 NOTE — ED PROVIDER NOTE - CLINICAL SUMMARY MEDICAL DECISION MAKING FREE TEXT BOX
30 yo F, smoker, [employer] worker, presents with acute sore throat and inability to swallow secretions. Vitals WNL. Pharyngeal erythema, hoarse voice and R cervical lymphadenopathy on physical exam. Concerning for epiglottis given inability to swallow secretions vs less likely allergic reaction given acute worsening of symptoms after chloraseptic. Will get soft tissue x-ray, start steroids, IV fluids, analgesia. ENT consulted after X-ray showed signs of epiglottitis. Pt transferred to [facility] for further management.

## 2019-10-11 NOTE — H&P ADULT - HISTORY OF PRESENT ILLNESS
HPI: 30yo F with no known PMH who presents with 1 day of sore throat. This morning woke up with sore throat, tried cepacol drops but throughout the day progressed to difficulty tolerating secretions, unable to take any fluids or PO and hoarse voice. She denies difficulty breathing at any point. She does not have any allergies, no history of angioedema. Reports subjective fever this AM, temperature 99.3 in ED. On admission, given 1 dose decadron and taken to CXR which showed possible epiglottitis, ENT called for airway evaluation.     PMH: none known  Meds: none    ENT returned to evaluate with improvement in swallowing, denying any SOB or difficulty breathing.     T(C): 37.4 (10-11-19 @ 15:14), Max: 37.4 (10-11-19 @ 15:14)  HR: 80 (10-11-19 @ 15:14) (80 - 80)  BP: 127/87 (10-11-19 @ 15:14) (127/87 - 127/87)  RR: 16 (10-11-19 @ 15:14) (16 - 16)  SpO2: 100% (10-11-19 @ 15:14) (100% - 100%)  Appears fatigued and uncomfortably  Breathing comfortably on RA, no stridor, no stertor  Face symmetric  EOMI  NC clear anteriorly  OC tongue wnl, soft palate flat, no edema, bl tonsils 2+ no purulence, posterior OP clear, spitting secretions, not swallowing  Voice muffled  Neck soft, flat, ELIU    Scope:  NC/NP wnl  OC/OP wnl  BOT fullness  Epiglottis with watery edema and erythema with obstruction of the supraglottis  Able to pass scope posteriorly between posterior pharyngeal and epiglottitis (~3mm) with visualization of glottis  R AE fold watery edema, L wnl  Bilateral TVC visualized no edema                          13.3   11.17 )-----------( 354      ( 11 Oct 2019 17:19 )             42.2       A/P: 30yo F with epiglottitis and epiglottic edema. Patient is stable on RA, will hold off on intubation at this time. Clinically stable/improving. If any sign of acute decompensation, call ENT stat, would need awake fiberoptic intubation.  - IV abx ctx/vanc, please ensure appropriate redosing  - NPO  - IVF  - blood cultures, labs  - decadron 10mg q6h  - ENT will rescope at 9p  - call ENT stat for any acute change, would need AWAKE nasotracheal intubation  - admit to SICU for airway monitoring  - discussed with Dr. Wilson

## 2019-10-11 NOTE — ED PROVIDER NOTE - ATTENDING CONTRIBUTION TO CARE
Patient is a 28 yo F here for evaluation of sore throat since this morning. Patient woke up this morning with sore throat. She tried Chloroseptic spray for relief of symptoms. She reports runny nose, sore throat, congestion, subjective tongue swelling. No rash, vomiting, diarrhea. No new medications except the chloroseptic around 12 noon. Denies travel. [place].     VS noted  Gen. no acute distress, Non toxic   HEENT: injected sclera, EOMI, mmm, pharyngeal erythema, no exudate, cervical lymphadenopathy  Lungs: CTAB/L no C/ W /R   CVS: RRR   Abd; Soft non tender, non distended   Ext: no edema  Skin: no rash  Neuro AAOx3 non focal clear speech  a/p: pharyngitis -   - Luciano CASTELLANOS Patient is a 28 yo F here for evaluation of sore throat since this morning. Patient woke up this morning with sore throat. She tried Chloroseptic spray for relief of symptoms. She reports runny nose, sore throat, congestion, subjective tongue swelling. No rash, vomiting, diarrhea. No new medications except the chloroseptic around 12 noon. Denies travel. Patient [place] in the airport.. Patient allergic to Naproxen.     VS noted  Gen. anxious tearful, states she cannot breathe  HEENT: injected sclera, EOMI, mmm, pharyngeal erythema, no exudate, no stridor  Lungs: CTAB/L no C/ W /R   CVS: RRR   Abd; Soft non tender, non distended   Ext: no edema  Skin: no rash  Neuro AAOx3 non focal clear speech  a/p: throat pain - patient not tolerating secretions - points to mid throat for pain. Concern for epiglottitis - patient immediately sent to XR, ENT immediately consulted. XR shows enlarged epiglottitis - patient getting decadron and labs, transferred to red attending, Dr. Castle for concern of epiglottitis. ENT team came to red side to scope patient.   - Luciano CASTELLANOS Patient is a 28 yo F here for evaluation of sore throat since this morning. Patient woke up this morning with sore throat. She tried Chloroseptic spray for relief of symptoms but states symptoms became worse afterwards. She reports runny nose, sore throat, congestion, subjective tongue swelling. No rash, vomiting, diarrhea. No new medications except the chloroseptic around 12 noon. Denies travel. Patient [place] in the [place].. Patient allergic to Naproxen.     VS noted  Gen. anxious tearful, states she cannot breathe, spitting seceretions into emesis bag  HEENT: injected sclera, EOMI, mmm, pharyngeal erythema, no exudate, no stridor  Lungs: CTAB/L no C/ W /R   CVS: RRR   Abd; Soft non tender, non distended   Ext: no edema  Skin: no rash  Neuro AAOx3 non focal clear speech  a/p: throat pain - patient not tolerating secretions - points to mid throat for pain. Concern for epiglottitis - patient immediately sent to XR, ENT immediately consulted. XR shows enlarged epiglottitis - patient getting decadron and labs, transferred to red attending, Dr. Castle for concern of epiglottitis. ENT team came to red side to scope patient.   - Luciano CASTELLANOS

## 2019-10-11 NOTE — ED ADULT NURSE REASSESSMENT NOTE - NS ED NURSE REASSESS COMMENT FT1
Pt started c/o itching while Vancomycin was infusing, no increased respiratory difficulty noted. Per MD, Vanco infusion rate slowed down. Pt reports relief in symptoms.

## 2019-10-11 NOTE — ED ADULT NURSE NOTE - OBJECTIVE STATEMENT
Pt presents to intake, A&Ox4, ambulatory w/o assistance, here for evaluation of sore throat that started today- states "I took this numbing spray for my throat and suddenly it became worse and I feel like my throat may be closing a little, I'm having some difficulty swallowing." Pt able to speak in full sentences, respirations mildly labor. Pt states she was told by her doctor not to take NSAIDs due to previous reaction- unsure of reaction. Pt taken to trauma B as per MD Wolf's orders due to concerning XR. Report endorsed to CAMACHO Nichole. Denies chest pain, palpitations, diaphoresis, headaches, fevers, dizziness, nausea, vomiting, diarrhea, or urinary symptoms at this time. IV established in left AC with a 20G, labs drawn and sent, call bell in reach, warm blanket provided, bed in lowest position, side rails up x2, MD evaluation in progress. Will continue to monitor.    Plan: meds, xray, US, CT, reassess.

## 2019-10-11 NOTE — PROGRESS NOTE ADULT - SUBJECTIVE AND OBJECTIVE BOX
ENT returned for rescope, improvement in swallowing, voice at baseline, no respiratory issues.     Scope:  Significant improvement in epiglottic edema, now with lingual surface of epiglottis with edema and purulence  R AE fold mild edema, significantly improved  View of glottis unobstructed    A/P: 30yo F with epiglottitis, improving with steroids and abx.   - continue IV abx, please redose appropriately  - continue decadron 10q6h  - will rescope AM  - continue SICU monitoring  - continue NPO/IVF  - discussed with attending

## 2019-10-11 NOTE — ED PROVIDER NOTE - PMH
No pertinent past medical history    No significant past medical history <<----- Click to add NO pertinent Past Medical History

## 2019-10-11 NOTE — CONSULT NOTE ADULT - ATTENDING COMMENTS
Critical Care Dx  Airway obstruction from epiglottis  ENT orders for steroids and abx appropriate   Con't monitoring    The patient is a critical care patient with life threatening respiratory instability in SICU.  I have personally interviewed and examined the patient, reviewed data and laboratory tests/x-rays and all pertinent electronic images.  The SICU team has a constant risk benefit analyzes discussion with the primary team, all consultants, House Staff and PA's on all decisions.   Time involved in performance of separately billable procedures was not counted toward my critical care time. There is no overlap.    I have personally provided 60 minutes of critical care time concurrently with the resident/fellow. This time excludes time spent on separate procedures and time spent teaching. I have reviewed the resident's/fellow's documentation and agree with the assessment and plan of care.  I was physically present for the key portions of the evaluation and management (E/M) service provided.      Yannick Rahman MD  Acute and Critical Care Surgery

## 2019-10-11 NOTE — ED PROVIDER NOTE - PROGRESS NOTE DETAILS
Dr. Castle: Patient transferred from intake due to concern for epiglottitis.  On arrival pt is awake and speaking: unable to swallow secretions but spitting them out effectively and maintaining airway.  Satting well and without increased work of breathing.  Breath sounds clear B/L.  Order for blood cultures, ceftriaxone, vancomycin.  Patient evaluated by ENT who confirm epiglottitis with video laryngoscopy: plan is for re-evaluation in 1 hour, ICU admission ENT consulted after X-ray showed signs of epiglottitis. Pt transferred to Red side for further management. Elizabeth Goldberger PGY-3: assumed care for pt after xfer from intake to red. ENT initially requested SICU admit but then suggested MICU instead for epiglottitis. Spoke w MICU, stated no beds currently  available and feel SICU more appropriate dispo. TO discuss w ENT

## 2019-10-11 NOTE — ED PROVIDER NOTE - OBJECTIVE STATEMENT
30 yo F, smoker, allergic to Naproxen, [place], presents with sore throat for 1 day. Patient reports waking up this morning with minor sore throat. At 12;30pm used Chloraseptic spray, and soon after that, began to experience worsening sore throat. Reports acute 10/10, pressure-like and burning-like sore throat, unable to swallow saliva or liquids.  No alleviating factors. Worse after Chloraseptic. Reports horse voice. Reports "coughing up saliva" Also reports associated runny nose, and red eyes, and subjective tongue swelling. Denies rashes, hives, or GI symptoms. Denies sick contacts but works at the [employer]. No travel outside the [place]. Denies chest pain or SOB. Denies  symptoms. LMP 1 day ago, no hx of STDs.

## 2019-10-11 NOTE — ED PROVIDER NOTE - NS ED ROS FT
GENERAL: No fever or chills  EYES: no change in vision, red eyes  HEENT: see hpi  CARDIAC: no chest pain or palpiations   PULMONARY: no cough or SOB  GI: no abdominal pain, nausea, vomiting, diarrhea, or constipation   : No changes in urination  SKIN: no rashes  NEURO: no headache, numbness, or weakness.  MSK: No joint pain

## 2019-10-12 LAB
ANION GAP SERPL CALC-SCNC: 13 MMO/L — SIGNIFICANT CHANGE UP (ref 7–14)
APTT BLD: 27.6 SEC — SIGNIFICANT CHANGE UP (ref 27.5–36.3)
BUN SERPL-MCNC: 7 MG/DL — SIGNIFICANT CHANGE UP (ref 7–23)
CALCIUM SERPL-MCNC: 9.7 MG/DL — SIGNIFICANT CHANGE UP (ref 8.4–10.5)
CHLORIDE SERPL-SCNC: 104 MMOL/L — SIGNIFICANT CHANGE UP (ref 98–107)
CO2 SERPL-SCNC: 21 MMOL/L — LOW (ref 22–31)
CREAT SERPL-MCNC: 0.65 MG/DL — SIGNIFICANT CHANGE UP (ref 0.5–1.3)
GLUCOSE SERPL-MCNC: 128 MG/DL — HIGH (ref 70–99)
HCT VFR BLD CALC: 42.7 % — SIGNIFICANT CHANGE UP (ref 34.5–45)
HGB BLD-MCNC: 13.1 G/DL — SIGNIFICANT CHANGE UP (ref 11.5–15.5)
INR BLD: 1.02 — SIGNIFICANT CHANGE UP (ref 0.88–1.17)
MAGNESIUM SERPL-MCNC: 2.1 MG/DL — SIGNIFICANT CHANGE UP (ref 1.6–2.6)
MCHC RBC-ENTMCNC: 26.5 PG — LOW (ref 27–34)
MCHC RBC-ENTMCNC: 30.7 % — LOW (ref 32–36)
MCV RBC AUTO: 86.3 FL — SIGNIFICANT CHANGE UP (ref 80–100)
NRBC # FLD: 0 K/UL — SIGNIFICANT CHANGE UP (ref 0–0)
PHOSPHATE SERPL-MCNC: 3.4 MG/DL — SIGNIFICANT CHANGE UP (ref 2.5–4.5)
PLATELET # BLD AUTO: 374 K/UL — SIGNIFICANT CHANGE UP (ref 150–400)
PMV BLD: 9.4 FL — SIGNIFICANT CHANGE UP (ref 7–13)
POTASSIUM SERPL-MCNC: 4.3 MMOL/L — SIGNIFICANT CHANGE UP (ref 3.5–5.3)
POTASSIUM SERPL-SCNC: 4.3 MMOL/L — SIGNIFICANT CHANGE UP (ref 3.5–5.3)
PROTHROM AB SERPL-ACNC: 11.6 SEC — SIGNIFICANT CHANGE UP (ref 9.8–13.1)
RBC # BLD: 4.95 M/UL — SIGNIFICANT CHANGE UP (ref 3.8–5.2)
RBC # FLD: 12.9 % — SIGNIFICANT CHANGE UP (ref 10.3–14.5)
SODIUM SERPL-SCNC: 138 MMOL/L — SIGNIFICANT CHANGE UP (ref 135–145)
SPECIMEN SOURCE: SIGNIFICANT CHANGE UP
SPECIMEN SOURCE: SIGNIFICANT CHANGE UP
WBC # BLD: 15.31 K/UL — HIGH (ref 3.8–10.5)
WBC # FLD AUTO: 15.31 K/UL — HIGH (ref 3.8–10.5)

## 2019-10-12 PROCEDURE — 99291 CRITICAL CARE FIRST HOUR: CPT

## 2019-10-12 RX ORDER — LANOLIN ALCOHOL/MO/W.PET/CERES
3 CREAM (GRAM) TOPICAL AT BEDTIME
Refills: 0 | Status: DISCONTINUED | OUTPATIENT
Start: 2019-10-12 | End: 2019-10-14

## 2019-10-12 RX ADMIN — SODIUM CHLORIDE 100 MILLILITER(S): 9 INJECTION, SOLUTION INTRAVENOUS at 07:31

## 2019-10-12 RX ADMIN — Medication 250 MILLIGRAM(S): at 17:12

## 2019-10-12 RX ADMIN — Medication 3 MILLIGRAM(S): at 21:35

## 2019-10-12 RX ADMIN — Medication 102 MILLIGRAM(S): at 12:45

## 2019-10-12 RX ADMIN — HEPARIN SODIUM 5000 UNIT(S): 5000 INJECTION INTRAVENOUS; SUBCUTANEOUS at 14:50

## 2019-10-12 RX ADMIN — Medication 102 MILLIGRAM(S): at 06:15

## 2019-10-12 RX ADMIN — Medication 102 MILLIGRAM(S): at 01:50

## 2019-10-12 RX ADMIN — Medication 250 MILLIGRAM(S): at 07:17

## 2019-10-12 RX ADMIN — CEFTRIAXONE 100 MILLIGRAM(S): 500 INJECTION, POWDER, FOR SOLUTION INTRAMUSCULAR; INTRAVENOUS at 17:11

## 2019-10-12 RX ADMIN — SODIUM CHLORIDE 100 MILLILITER(S): 9 INJECTION, SOLUTION INTRAVENOUS at 15:55

## 2019-10-12 RX ADMIN — Medication 102 MILLIGRAM(S): at 17:54

## 2019-10-12 NOTE — PROGRESS NOTE ADULT - SUBJECTIVE AND OBJECTIVE BOX
Patient evaluated around 330pm    Scope    NC/NP wnl  OC/OP wnl  BOT swelling improved, still with residual right base of tongue edema  Epiglottis swelling improved, still with residual lingual surface edema right worse than left  bilateral AE fold swelling resolved  View of glottis unobstructed    -okay for clears at this time  -will rescope tomorrow 10/13 morning

## 2019-10-12 NOTE — PROGRESS NOTE ADULT - SUBJECTIVE AND OBJECTIVE BOX
Patient seen and examined, no acute events overnight, improved swallowing and voice, no difficulty breathing.     T(C): 36.6 (10-12-19 @ 04:00), Max: 37.4 (10-11-19 @ 15:14)  HR: 72 (10-12-19 @ 06:00) (55 - 85)  BP: 145/84 (10-12-19 @ 06:00) (88/51 - 145/84)  RR: 21 (10-12-19 @ 06:00) (13 - 21)  SpO2: 99% (10-12-19 @ 06:00) (98% - 100%)  Well appearing, NAD  Breathing comfortably on RA, no stridor, no stertor  Face symmetric  EOMI  NC clear anteriorly  OC tongue wnl, soft palate flat, no edema, bl tonsils 2+ no purulence, posterior OP clear, tolerating secretions  Voice wnl  Neck soft, flat, ELIU    Scope:  NC/NP wnl  OC/OP wnl  BOT fullness  Significant improvement in epiglottic edema, now with lingual surface of epiglottis with edema and purulence  R AE fold mild edema, significantly improved  View of glottis unobstructed                          13.3   11.17 )-----------( 354      ( 11 Oct 2019 17:19 )             42.2       A/P: 28yo F with epiglottitis, improving on steroids and IV abx, no further airway edema.   - IV abx ctx/vanc, please ensure appropriate redosing  - clear liquid diet, advance as tolerated  - decadron 10mg q6h x24 hours total  - downgrade from SICU today to regular floor  - will discuss with Dr. Wilson Patient seen and examined, no acute events overnight, improved swallowing and voice, no difficulty breathing.     T(C): 36.6 (10-12-19 @ 04:00), Max: 37.4 (10-11-19 @ 15:14)  HR: 72 (10-12-19 @ 06:00) (55 - 85)  BP: 145/84 (10-12-19 @ 06:00) (88/51 - 145/84)  RR: 21 (10-12-19 @ 06:00) (13 - 21)  SpO2: 99% (10-12-19 @ 06:00) (98% - 100%)  Well appearing, NAD  Breathing comfortably on RA, no stridor, no stertor  Face symmetric  EOMI  NC clear anteriorly  OC tongue wnl, soft palate flat, no edema, bl tonsils 2+ no purulence, posterior OP clear, tolerating secretions  Voice wnl  Neck soft, flat, ELIU    Scope:  NC/NP wnl  OC/OP wnl  BOT fullness  epiglottis stable from prior exam, lingual surface with purulence  R AE fold mild edema, significantly improved  View of glottis unobstructed                          13.3   11.17 )-----------( 354      ( 11 Oct 2019 17:19 )             42.2       A/P: 30yo F with epiglottitis, improving on steroids and IV abx, still with epiglottic edema and purulence and BOT fullness, mild AE fold edema R side, continue airway monitoring.  - IV abx ctx/vanc, please ensure appropriate redosing  - NPO/IVF  - will rescope this evening  - decadron 10mg q6h continue  - keep in SICU until resolution of airway edema  - will discuss with Dr. Wilson

## 2019-10-12 NOTE — PROGRESS NOTE ADULT - SUBJECTIVE AND OBJECTIVE BOX
SICU Progress Note  =====================================================  - No acute events overnight    HPI: 28yo F with no known PMH who presents with 1 day of sore throat. This morning woke up with sore throat, tried cepacol drops but throughout the day progressed to difficulty tolerating secretions, unable to take any fluids or PO and hoarse voice. She denies difficulty breathing at any point. She does not have any allergies, no history of angioedema. Reports subjective fever this AM, temperature 99.3 in ED. On admission, given 1 dose decadron and taken to CXR which showed possible epiglottitis, ENT called for airway evaluation and scope demonstrated edematous epiglottis and secretions. SICU was consulted for airway monitoring.       Allergies:   PAST MEDICAL & SURGICAL HISTORY:  No pertinent past medical history  No significant past medical history  No significant past surgical history  No significant past surgical history    FAMILY HISTORY:  No pertinent family history in first degree relatives  No pertinent family history in first degree relatives      SOCIAL HISTORY:      ADVANCE DIRECTIVES: Presumed Full Code     REVIEW OF SYSTEMS:    General: Non-Contributory  Skin/Breast: Non-Contributory  Ophthalmologic: Non-Contributory  ENMT: Non-Contributory  Respiratory and Thorax: Non-Contributory  Cardiovascular: Non-Contributory  Gastrointestinal: Non-Contributory  Genitourinary: Non-Contributory  Musculoskeletal: Non-Contributory  Neurological: Non-Contributory  Psychiatric: Non-Contributory  Hematology/Lymphatics: Non-Contributory  Endocrine: Non-Contributory  Allergic/Immunologic: Non-Contributory    HOME MEDICATIONS:      CURRENT MEDICATIONS:   --------------------------------------------------------------------------------------  Neurologic Medications    Respiratory Medications    Cardiovascular Medications    Gastrointestinal Medications    Genitourinary Medications    Hematologic/Oncologic Medications    Antimicrobial/Immunologic Medications    Endocrine/Metabolic Medications    Topical/Other Medications    --------------------------------------------------------------------------------------    VITAL SIGNS, INS/OUTS (last 24 hours):  --------------------------------------------------------------------------------------  T(C): 37.1 (10-11-19 @ 21:08), Max: 37.4 (10-11-19 @ 15:14)  HR: 60 (10-12-19 @ 00:00) (60 - 85)  BP: 106/58 (10-11-19 @ 22:00) (106/58 - 137/77)  ABP: --  ABP(mean): --  RR: 13 (10-12-19 @ 00:00) (13 - 18)  SpO2: 100% (10-12-19 @ 00:00) (98% - 100%)  Wt(kg): --  CVP(mm Hg): --      10-11 @ 07:01  -  10-12 @ 01:28  --------------------------------------------------------  IN:    lactated ringers.: 300 mL  Total IN: 300 mL    OUT:    Voided: 600 mL  Total OUT: 600 mL    Total NET: -300 mL            --------------------------------------------------------------------------------------    EXAM  NEUROLOGY  Exam: Normal, NAD, alert, oriented x 3, no focal deficits.     HEENT  Exam: Normocephalic, atraumatic.  EOMI. Mild hoarseness with speech.    RESPIRATORY  Exam: Lungs clear to auscultation, Normal expansion/effort.      CARDIOVASCULAR  Exam: S1, S2.  Regular rate and rhythm.  Peripheral edema      GI/NUTRITION  Exam: Abdomen soft, Non-tender, Non-distended.    Current Diet:  NPO     VASCULAR  Exam: Extremities warm, pink, well-perfused.     MUSCULOSKELETAL  Exam: All extremities moving spontaneously without limitations.      SKIN:  Exam: Good skin turgor, no skin breakdown.      METABOLIC/FLUIDS/ELECTROLYTES      HEMATOLOGIC  [x] DVT Prophylaxis:   Transfusions:	[] PRBC	[] Platelets		[] FFP	[] Cryoprecipitate    INFECTIOUS DISEASE  Antimicrobials/Immunologic Medications:    Tubes/Lines/Drains    [x] Peripheral IV  [] Central Venous Line     	[] R	[] L	[] IJ	[] Fem	[] SC	Date Placed:   [] Arterial Line		[] R	[] L	[] Fem	[] Rad	[] Ax	Date Placed:   [] PICC:         	[] Midline		[] Mediport  [] Urinary Catheter		Date Placed:     LABS  --------------------------------------------------------------------------------------  CBC (10-11 @ 17:19)                              13.3                           11.17<H>  )----------------(  354        78.9<H>% Neutrophils, 12.9<L>% Lymphocytes, ANC: 8.82<H>                              42.2                  BMP (10-11 @ 17:19)             138     |  99      |  11    		Ca++ --      Ca 10.3               ---------------------------------( 94    		Mg --                 3.7     |  22      |  0.84  			Ph --        LFTs (10-11 @ 17:19)      TPro 8.5<H> / Alb 5.2<H> / TBili 0.5 / DBili -- / AST 19 / ALT 10 / AlkPhos 70      --------------------------------------------------------------------------------------    OTHER LABS    IMAGING RESULTS  Echo:   CT:   Xray:     ASSESSMENT:  29y Female presenting with epiglottitis	    PLAN:      Neurologic:   - No active issues    Respiratory:   - Continue decadron   - Monitor respiratory status - if any decline, will require nasotracheal fiberoptic intubation    Cardiovascular:   - No active issues    Gastrointestinal/Nutrition:   - No active issues    Renal/Genitourinary:   - No active issues    Hematologic:   - No active issues    Infectious Disease:   - Continue Ceftriaxone and Vancomycin    Tubes/Lines/Drains:   - PIV    Endocrine:   - No active issues    Disposition:   - SICU    --------------------------------------------------------------------------------------    Critical Care Diagnoses: SICU Progress Note  =====================================================  - No acute events overnight    HPI: 30yo F with no known PMH who presents with 1 day of sore throat. This morning woke up with sore throat, tried cepacol drops but throughout the day progressed to difficulty tolerating secretions, unable to take any fluids or PO and hoarse voice. She denies difficulty breathing at any point. She does not have any allergies, no history of angioedema. Reports subjective fever this AM, temperature 99.3 in ED. On admission, given 1 dose decadron and taken to CXR which showed possible epiglottitis, ENT called for airway evaluation and scope demonstrated edematous epiglottis and secretions. SICU was consulted for airway monitoring.       Allergies:   PAST MEDICAL & SURGICAL HISTORY:  No pertinent past medical history  No significant past medical history  No significant past surgical history  No significant past surgical history    FAMILY HISTORY:  No pertinent family history in first degree relatives  No pertinent family history in first degree relatives      SOCIAL HISTORY:      ADVANCE DIRECTIVES: Presumed Full Code     REVIEW OF SYSTEMS:    General: Non-Contributory  Skin/Breast: Non-Contributory  Ophthalmologic: Non-Contributory  ENMT: Non-Contributory  Respiratory and Thorax: Non-Contributory  Cardiovascular: Non-Contributory  Gastrointestinal: Non-Contributory  Genitourinary: Non-Contributory  Musculoskeletal: Non-Contributory  Neurological: Non-Contributory  Psychiatric: Non-Contributory  Hematology/Lymphatics: Non-Contributory  Endocrine: Non-Contributory  Allergic/Immunologic: Non-Contributory    HOME MEDICATIONS:      CURRENT MEDICATIONS:   --------------------------------------------------------------------------------------  Neurologic Medications    Respiratory Medications    Cardiovascular Medications    Gastrointestinal Medications    Genitourinary Medications    Hematologic/Oncologic Medications    Antimicrobial/Immunologic Medications    Endocrine/Metabolic Medications    Topical/Other Medications    --------------------------------------------------------------------------------------    VITAL SIGNS, INS/OUTS (last 24 hours):  --------------------------------------------------------------------------------------  T(C): 37.1 (10-11-19 @ 21:08), Max: 37.4 (10-11-19 @ 15:14)  HR: 60 (10-12-19 @ 00:00) (60 - 85)  BP: 106/58 (10-11-19 @ 22:00) (106/58 - 137/77)  ABP: --  ABP(mean): --  RR: 13 (10-12-19 @ 00:00) (13 - 18)  SpO2: 100% (10-12-19 @ 00:00) (98% - 100%)  Wt(kg): --  CVP(mm Hg): --      10-11 @ 07:01  -  10-12 @ 01:28  --------------------------------------------------------  IN:    lactated ringers.: 300 mL  Total IN: 300 mL    OUT:    Voided: 600 mL  Total OUT: 600 mL    Total NET: -300 mL            --------------------------------------------------------------------------------------    EXAM  NEUROLOGY  Exam: Normal, NAD, alert, oriented x 3, no focal deficits.     HEENT  Exam: Normocephalic, atraumatic.  EOMI. Mild hoarseness with speech.    RESPIRATORY  Exam: Lungs clear to auscultation, Normal expansion/effort.      CARDIOVASCULAR  Exam: S1, S2.  Regular rate and rhythm.  Peripheral edema      GI/NUTRITION  Exam: Abdomen soft, Non-tender, Non-distended.    Current Diet:  NPO     VASCULAR  Exam: Extremities warm, pink, well-perfused.     MUSCULOSKELETAL  Exam: All extremities moving spontaneously without limitations.      SKIN:  Exam: Good skin turgor, no skin breakdown.      METABOLIC/FLUIDS/ELECTROLYTES      HEMATOLOGIC  [x] DVT Prophylaxis:   Transfusions:	[] PRBC	[] Platelets		[] FFP	[] Cryoprecipitate    INFECTIOUS DISEASE  Antimicrobials/Immunologic Medications:    Tubes/Lines/Drains    [x] Peripheral IV  [] Central Venous Line     	[] R	[] L	[] IJ	[] Fem	[] SC	Date Placed:   [] Arterial Line		[] R	[] L	[] Fem	[] Rad	[] Ax	Date Placed:   [] PICC:         	[] Midline		[] Mediport  [] Urinary Catheter		Date Placed:     LABS  --------------------------------------------------------------------------------------  CBC (10-11 @ 17:19)                              13.3                           11.17<H>  )----------------(  354        78.9<H>% Neutrophils, 12.9<L>% Lymphocytes, ANC: 8.82<H>                              42.2                  BMP (10-11 @ 17:19)             138     |  99      |  11    		Ca++ --      Ca 10.3               ---------------------------------( 94    		Mg --                 3.7     |  22      |  0.84  			Ph --        LFTs (10-11 @ 17:19)      TPro 8.5<H> / Alb 5.2<H> / TBili 0.5 / DBili -- / AST 19 / ALT 10 / AlkPhos 70      --------------------------------------------------------------------------------------    OTHER LABS    IMAGING RESULTS  Echo:   CT:   Xray:     ASSESSMENT:  29y Female presenting with epiglottitis	    PLAN:      Neurologic:   - No active issues    Respiratory:   - Continue decadron   - Monitor respiratory status - if any decline, will require nasotracheal fiberoptic intubation    Cardiovascular:   - No active issues    Gastrointestinal/Nutrition:   - No active issues  - NPO per ENT recs    Renal/Genitourinary:   - No active issues  - LR @ 100cc/h    Hematologic:   - No active issues  - SQH q8h for dvt ppx     Infectious Disease:   - Continue Ceftriaxone and Vancomycin    Tubes/Lines/Drains:   - PIV    Endocrine:   - No active issues    Disposition:   - SICU    --------------------------------------------------------------------------------------    Critical Care Diagnoses: epiglottitis

## 2019-10-13 LAB
ANION GAP SERPL CALC-SCNC: 13 MMO/L — SIGNIFICANT CHANGE UP (ref 7–14)
BUN SERPL-MCNC: 8 MG/DL — SIGNIFICANT CHANGE UP (ref 7–23)
CALCIUM SERPL-MCNC: 9.3 MG/DL — SIGNIFICANT CHANGE UP (ref 8.4–10.5)
CHLORIDE SERPL-SCNC: 102 MMOL/L — SIGNIFICANT CHANGE UP (ref 98–107)
CO2 SERPL-SCNC: 22 MMOL/L — SIGNIFICANT CHANGE UP (ref 22–31)
CREAT SERPL-MCNC: 0.6 MG/DL — SIGNIFICANT CHANGE UP (ref 0.5–1.3)
GLUCOSE SERPL-MCNC: 151 MG/DL — HIGH (ref 70–99)
HCT VFR BLD CALC: 37.2 % — SIGNIFICANT CHANGE UP (ref 34.5–45)
HGB BLD-MCNC: 11.9 G/DL — SIGNIFICANT CHANGE UP (ref 11.5–15.5)
MAGNESIUM SERPL-MCNC: 2.1 MG/DL — SIGNIFICANT CHANGE UP (ref 1.6–2.6)
MCHC RBC-ENTMCNC: 26.7 PG — LOW (ref 27–34)
MCHC RBC-ENTMCNC: 32 % — SIGNIFICANT CHANGE UP (ref 32–36)
MCV RBC AUTO: 83.6 FL — SIGNIFICANT CHANGE UP (ref 80–100)
NRBC # FLD: 0 K/UL — SIGNIFICANT CHANGE UP (ref 0–0)
PHOSPHATE SERPL-MCNC: 3 MG/DL — SIGNIFICANT CHANGE UP (ref 2.5–4.5)
PLATELET # BLD AUTO: 332 K/UL — SIGNIFICANT CHANGE UP (ref 150–400)
PMV BLD: 9.7 FL — SIGNIFICANT CHANGE UP (ref 7–13)
POTASSIUM SERPL-MCNC: 4.1 MMOL/L — SIGNIFICANT CHANGE UP (ref 3.5–5.3)
POTASSIUM SERPL-SCNC: 4.1 MMOL/L — SIGNIFICANT CHANGE UP (ref 3.5–5.3)
RBC # BLD: 4.45 M/UL — SIGNIFICANT CHANGE UP (ref 3.8–5.2)
RBC # FLD: 13.1 % — SIGNIFICANT CHANGE UP (ref 10.3–14.5)
SODIUM SERPL-SCNC: 137 MMOL/L — SIGNIFICANT CHANGE UP (ref 135–145)
VANCOMYCIN TROUGH SERPL-MCNC: 3 UG/ML — LOW (ref 10–20)
WBC # BLD: 11.69 K/UL — HIGH (ref 3.8–10.5)
WBC # FLD AUTO: 11.69 K/UL — HIGH (ref 3.8–10.5)

## 2019-10-13 PROCEDURE — 99232 SBSQ HOSP IP/OBS MODERATE 35: CPT

## 2019-10-13 RX ORDER — VANCOMYCIN HCL 1 G
1250 VIAL (EA) INTRAVENOUS EVERY 12 HOURS
Refills: 0 | Status: DISCONTINUED | OUTPATIENT
Start: 2019-10-13 | End: 2019-10-14

## 2019-10-13 RX ADMIN — CEFTRIAXONE 100 MILLIGRAM(S): 500 INJECTION, POWDER, FOR SOLUTION INTRAMUSCULAR; INTRAVENOUS at 16:39

## 2019-10-13 RX ADMIN — Medication 250 MILLIGRAM(S): at 18:15

## 2019-10-13 RX ADMIN — Medication 3 MILLIGRAM(S): at 22:37

## 2019-10-13 RX ADMIN — Medication 102 MILLIGRAM(S): at 06:18

## 2019-10-13 RX ADMIN — Medication 250 MILLIGRAM(S): at 05:16

## 2019-10-13 RX ADMIN — Medication 102 MILLIGRAM(S): at 00:30

## 2019-10-13 RX ADMIN — Medication 102 MILLIGRAM(S): at 11:09

## 2019-10-13 RX ADMIN — Medication 102 MILLIGRAM(S): at 18:15

## 2019-10-13 NOTE — PROGRESS NOTE ADULT - ASSESSMENT
ASSESSMENT:  29y Female presenting with epiglottitis	    PLAN:      Neurologic:   - No active issues    Respiratory:   - Continue decadron   - Monitor respiratory status - if any decline, will require nasotracheal fiberoptic intubation    Cardiovascular:   - No active issues    Gastrointestinal/Nutrition:   - No active issues  - NPO per ENT recs    Renal/Genitourinary:   - No active issues  - LR @ 100cc/h    Hematologic:   - No active issues  - SQH q8h for dvt ppx     Infectious Disease:   - Continue Ceftriaxone and Vancomycin    Tubes/Lines/Drains:   - PIV    Endocrine:   - No active issues    Disposition:   - SICU    --------------------------------------------------------------------------------------    Critical Care Diagnoses: epiglottitis ASSESSMENT:  29y Female presenting with epiglottitis	    PLAN:      Neurologic:   - No active issues    Respiratory:   - Continue decadron   - Monitor respiratory status - if any decline, will require nasotracheal fiberoptic intubation    Cardiovascular:   - No active issues    Gastrointestinal/Nutrition:   - No active issues  - Transition to regular diet    Renal/Genitourinary:   - No active issues  - LR @ 100cc/h    Hematologic:   - No active issues  - SQH q8h for dvt ppx     Infectious Disease:   - Continue Ceftriaxone and Vancomycin    Tubes/Lines/Drains:   - PIV    Endocrine:   -C/w steroids     Disposition:   - SICU    --------------------------------------------------------------------------------------    Critical Care Diagnoses: epiglottitis

## 2019-10-13 NOTE — PROGRESS NOTE ADULT - ATTENDING COMMENTS
29y Female presenting with epiglottitis	    PLAN:      Neurologic:   - No active issues    Respiratory:   - Continue decadron   - Monitor respiratory status - if any decline, will require nasotracheal fiberoptic intubation    Cardiovascular:   - No active issues    Gastrointestinal/Nutrition:   - No active issues  - NPO per ENT recs    Renal/Genitourinary:   - No active issues  - LR @ 100cc/h    Hematologic:   - No active issues  - SQH q8h for dvt ppx     Infectious Disease:   - Continue Ceftriaxone and Vancomycin    Tubes/Lines/Drains:   - PIV    Endocrine:   - No active issues    Disposition:   - SICU    Critical Care Diagnoses: epiglottitis, respiratory abnormality.  The patient is a critical care patient with life threatening hemodynamic and metabolic instability in SICU.  I have personally interviewed when possible and examined the patient, reviewed data and laboratory tests/x-rays and all pertinent electronic images.  I was physically present for the key portions of the evaluation and management (E/M) service provided.   The SICU team has a constant risk benefit analyzes discussion with the primary team, all consultants, House Staff and PA's on all decisions.  These diagnoses are unrelated to the surgical procedure noted above.  I meet with family if needed to get further history, discuss the case and make care decisions for this patient who might not be able to participate.  Time involved in performance of separately billable procedures was not counted toward my critical care time. There is no overlap.  I spent 55-75 minutes ( 0800Hrs-0915Hrs in AM/ 1600Hrs-1715Hrs in PM, or other time indicated) of critical care time for the diagnoses, assessment, plan and interventions.  This time excludes time spent on separate procedures and teaching as discussed above.
29y Female presenting with epiglottitis	    PLAN:      Neurologic:   - No active issues    Respiratory:   - Continue decadron   - Monitor respiratory status - if any decline, will require nasotracheal fiberoptic intubation    Cardiovascular:   - No active issues    Gastrointestinal/Nutrition:   - No active issues  - NPO per ENT recs    Renal/Genitourinary:   - No active issues  - LR @ 100cc/h    Hematologic:   - No active issues  - SQH q8h for dvt ppx     Infectious Disease:   - Continue Ceftriaxone and Vancomycin    Tubes/Lines/Drains:   - PIV    Endocrine:   - No active issues    Disposition:   - SICU/ DC floor    Critical Care Diagnoses: epiglottitis, respiratory abnormality.  The patient is not a critical care patient with no life threatening hemodynamic and metabolic instability in SICU.  I have personally interviewed when possible and examined the patient, reviewed data and laboratory tests/x-rays and all pertinent electronic images.  I was physically present for the key portions of the evaluation and management (E/M) service provided.   The SICU team has a constant risk benefit analyzes discussion with the primary team, all consultants, House Staff and PA's on all decisions.  These diagnoses are unrelated to the surgical procedure noted above.  I meet with family if needed to get further history, discuss the case and make care decisions for this patient who might not be able to participate.  Time involved in performance of separately billable procedures was not counted toward my critical care time. There is no overlap.  I spent 55-75 minutes ( 0800Hrs-0915Hrs in AM/ 1600Hrs-1715Hrs in PM, or other time indicated) of critical care time for the diagnoses, assessment, plan and interventions.  This time excludes time spent on separate procedures and teaching as discussed above.

## 2019-10-13 NOTE — PROGRESS NOTE ADULT - SUBJECTIVE AND OBJECTIVE BOX
No issues overnight  No dyspnea  Patient thinks voice further improving, tolerating clears    ICU Vital Signs Last 24 Hrs  T(C): 36.7 (13 Oct 2019 04:00), Max: 37 (12 Oct 2019 16:00)  T(F): 98 (13 Oct 2019 04:00), Max: 98.6 (12 Oct 2019 16:00)  HR: 62 (13 Oct 2019 06:00) (54 - 88)  BP: 99/51 (13 Oct 2019 06:00) (73/52 - 132/107)  BP(mean): 63 (13 Oct 2019 06:00) (57 - 114)  ABP: --  ABP(mean): --  RR: 17 (13 Oct 2019 06:00) (12 - 24)  SpO2: 98% (13 Oct 2019 06:00) (97% - 100%)    Exam  Well appearing, NAD  Breathing comfortably on RA, no stridor, no stertor  Voice normal  OC tongue wnl, soft palate flat, no edema, bl tonsils 2+ no purulence, posterior OP clear, tolerating secretions  Neck soft, flat, full range of motion    A/P: 30yo F admitted 10/11 with epiglottitis, improving on steroids and IV abx    -decadron 10mg q6h continue  -continue IV abx. Ceftriaxone/Vancomycin  -pain control   -okay for clears  -IV abx ctx/vanc, please ensure appropriate redosing  -NPO/IVF  -will rescope this evening No issues overnight  No dyspnea  Patient thinks voice further improving, tolerating clears    ICU Vital Signs Last 24 Hrs  T(C): 36.7 (13 Oct 2019 04:00), Max: 37 (12 Oct 2019 16:00)  T(F): 98 (13 Oct 2019 04:00), Max: 98.6 (12 Oct 2019 16:00)  HR: 62 (13 Oct 2019 06:00) (54 - 88)  BP: 99/51 (13 Oct 2019 06:00) (73/52 - 132/107)  BP(mean): 63 (13 Oct 2019 06:00) (57 - 114)  ABP: --  ABP(mean): --  RR: 17 (13 Oct 2019 06:00) (12 - 24)  SpO2: 98% (13 Oct 2019 06:00) (97% - 100%)    Exam  NC/NP wnl  OC/OP wnl  BOT normal without swelling  Epiglottis swelling improved, still with residual lingual surface edema right worse than  bilateral AE folds normal  arytenoid normal  bilateral VC fully mobile  View of glottis unobstructed    A/P: 30yo F admitted 10/11 with epiglottitis, improving on steroids and IV abx    -okay to transition to floor, continuous pulse ox  -okay to advance to regular diet   -continue decadron 10mg q6h  -continue IV abx. Ceftriaxone/Vancomycin 10/11-  -pain control   -HOB elevation  -regular diet  -bowel regimen  -oob ad mary lou, encourage ambulation  -Dispo: likely home after acuity of infection resolves

## 2019-10-13 NOTE — PROGRESS NOTE ADULT - SUBJECTIVE AND OBJECTIVE BOX
MUSCULOSKELETAL  Exam: All extremities moving spontaneously without limitations.      SKIN:  Exam: Good skin turgor, no skin breakdown.      METABOLIC/FLUIDS/ELECTROLYTES      HEMATOLOGIC  [x] DVT Prophylaxis:   Transfusions:	[] PRBC	[] Platelets		[] FFP	[] Cryoprecipitate    INFECTIOUS DISEASE  Antimicrobials/Immunologic Medications:    Tubes/Lines/Drains    [x] Peripheral IV  [] Central Venous Line     	[] R	[] L	[] IJ	[] Fem	[] SC	Date Placed:   [] Arterial Line		[] R	[] L	[] Fem	[] Rad	[] Ax	Date Placed:   [] PICC:         	[] Midline		[] Mediport  [] Urinary Catheter		Date Placed:     LABS  --------------------------------------------------------------------------------------  CBC (10-11 @ 17:19)                              13.3                           11.17<H>  )----------------(  354        78.9<H>% Neutrophils, 12.9<L>% Lymphocytes, ANC: 8.82<H>                              42.2                  BMP (10-11 @ 17:19)             138     |  99      |  11    		Ca++ --      Ca 10.3               ---------------------------------( 94    		Mg --                 3.7     |  22      |  0.84  			Ph --        LFTs (10-11 @ 17:19)      TPro 8.5<H> / Alb 5.2<H> / TBili 0.5 / DBili -- / AST 19 / ALT 10 / AlkPhos 70      --------------------------------------------------------------------------------------    OTHER LABS    IMAGING RESULTS  Echo:   CT:   Xray:

## 2019-10-14 ENCOUNTER — TRANSCRIPTION ENCOUNTER (OUTPATIENT)
Age: 29
End: 2019-10-14

## 2019-10-14 VITALS
HEART RATE: 54 BPM | SYSTOLIC BLOOD PRESSURE: 103 MMHG | TEMPERATURE: 98 F | OXYGEN SATURATION: 99 % | DIASTOLIC BLOOD PRESSURE: 57 MMHG | RESPIRATION RATE: 15 BRPM

## 2019-10-14 LAB
ANION GAP SERPL CALC-SCNC: 13 MMO/L — SIGNIFICANT CHANGE UP (ref 7–14)
BUN SERPL-MCNC: 12 MG/DL — SIGNIFICANT CHANGE UP (ref 7–23)
CALCIUM SERPL-MCNC: 9.2 MG/DL — SIGNIFICANT CHANGE UP (ref 8.4–10.5)
CHLORIDE SERPL-SCNC: 103 MMOL/L — SIGNIFICANT CHANGE UP (ref 98–107)
CO2 SERPL-SCNC: 20 MMOL/L — LOW (ref 22–31)
CREAT SERPL-MCNC: 0.65 MG/DL — SIGNIFICANT CHANGE UP (ref 0.5–1.3)
GLUCOSE SERPL-MCNC: 206 MG/DL — HIGH (ref 70–99)
HCT VFR BLD CALC: 37.3 % — SIGNIFICANT CHANGE UP (ref 34.5–45)
HGB BLD-MCNC: 12 G/DL — SIGNIFICANT CHANGE UP (ref 11.5–15.5)
MAGNESIUM SERPL-MCNC: 2.2 MG/DL — SIGNIFICANT CHANGE UP (ref 1.6–2.6)
MCHC RBC-ENTMCNC: 27.3 PG — SIGNIFICANT CHANGE UP (ref 27–34)
MCHC RBC-ENTMCNC: 32.2 % — SIGNIFICANT CHANGE UP (ref 32–36)
MCV RBC AUTO: 84.8 FL — SIGNIFICANT CHANGE UP (ref 80–100)
NRBC # FLD: 0 K/UL — SIGNIFICANT CHANGE UP (ref 0–0)
PHOSPHATE SERPL-MCNC: 2.6 MG/DL — SIGNIFICANT CHANGE UP (ref 2.5–4.5)
PLATELET # BLD AUTO: 308 K/UL — SIGNIFICANT CHANGE UP (ref 150–400)
PMV BLD: 9.5 FL — SIGNIFICANT CHANGE UP (ref 7–13)
POTASSIUM SERPL-MCNC: 4.1 MMOL/L — SIGNIFICANT CHANGE UP (ref 3.5–5.3)
POTASSIUM SERPL-SCNC: 4.1 MMOL/L — SIGNIFICANT CHANGE UP (ref 3.5–5.3)
RBC # BLD: 4.4 M/UL — SIGNIFICANT CHANGE UP (ref 3.8–5.2)
RBC # FLD: 13 % — SIGNIFICANT CHANGE UP (ref 10.3–14.5)
SODIUM SERPL-SCNC: 136 MMOL/L — SIGNIFICANT CHANGE UP (ref 135–145)
WBC # BLD: 12.11 K/UL — HIGH (ref 3.8–10.5)
WBC # FLD AUTO: 12.11 K/UL — HIGH (ref 3.8–10.5)

## 2019-10-14 RX ADMIN — HEPARIN SODIUM 5000 UNIT(S): 5000 INJECTION INTRAVENOUS; SUBCUTANEOUS at 06:37

## 2019-10-14 NOTE — DISCHARGE NOTE PROVIDER - HOSPITAL COURSE
Pt was admitted for treatment and observation for epiglottitis. She was initially observed in the ICU. Throughout hospital stay she had serial laryngoscopy exams done, which showed progressive improvement in the swelling of her epiglottis in addition to her overall clinical improvement. On HD3 she was transferred to the floor. At discharge, she was tolerating PO, pain was controlled, breathing and swallowing comfortably.

## 2019-10-14 NOTE — PROGRESS NOTE ADULT - SUBJECTIVE AND OBJECTIVE BOX
Patient seen and examined, no difficulty breathing, tolerating regular diet. Cleared for downgrade to floor, awaiting bed availability. Vanc trough 3 yesterday, increased to 1250BID.    T(C): 36.8 (10-14-19 @ 04:00), Max: 37.1 (10-13-19 @ 16:00)  HR: 50 (10-14-19 @ 04:00) (49 - 88)  BP: 99/58 (10-14-19 @ 04:00) (91/56 - 116/68)  RR: 14 (10-14-19 @ 04:00) (12 - 25)  SpO2: 97% (10-14-19 @ 04:00) (95% - 99%)  Well appearing, NAD  Breathing comfortably on RA, no stridor, no stertor  Voice normal  OC tongue wnl, soft palate flat, no edema, bl tonsils 2+ no purulence, posterior OP clear, tolerating secretions  Neck soft, flat, full range of motion    Scope:  Epiglottis with lingual surface edema and purulence significantly improved  Exam otherwise wnl    Vanc trough: 3  glucose 206  WBC 12    A/P: 30yo F admitted 10/11 with epiglottitis, improving on steroids and IV abx. Steroids now discontinued.   -continue IV abx - ceftriaxone/Vancomycin  - vanc trough Tuesday PM  -pain control   -regular diet  -tx to floor  -will d/w attending

## 2019-10-14 NOTE — DISCHARGE NOTE NURSING/CASE MANAGEMENT/SOCIAL WORK - PATIENT PORTAL LINK FT
You can access the FollowMyHealth Patient Portal offered by [facility] by registering at the following website: [email or web address]. By joining [employer]’s FollowMyHealth portal, you will also be able to view your health information using other applications (apps) compatible with our system.

## 2019-10-14 NOTE — DISCHARGE NOTE PROVIDER - CARE PROVIDER_API CALL
Patel Wilson)  Otolaryngology  [address] [place] [address], [address]  [place] [address]  Phone: [phone]  Fax: [phone]  Follow Up Time:

## 2019-10-16 LAB
BACTERIA BLD CULT: SIGNIFICANT CHANGE UP
BACTERIA BLD CULT: SIGNIFICANT CHANGE UP

## 2020-02-26 ENCOUNTER — EMERGENCY (EMERGENCY)
Facility: HOSPITAL | Age: 30
LOS: 0 days | Discharge: HOME | End: 2020-02-26
Attending: EMERGENCY MEDICINE
Payer: SELF-PAY

## 2020-02-26 VITALS
HEIGHT: 64 IN | SYSTOLIC BLOOD PRESSURE: 109 MMHG | WEIGHT: 160.06 LBS | OXYGEN SATURATION: 100 % | RESPIRATION RATE: 16 BRPM | HEART RATE: 99 BPM | DIASTOLIC BLOOD PRESSURE: 60 MMHG | TEMPERATURE: 99 F

## 2020-02-26 DIAGNOSIS — L50.9 URTICARIA, UNSPECIFIED: ICD-10-CM
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
## 2020-02-26 DIAGNOSIS — Z88.8 ALLERGY STATUS TO OTHER DRUGS, MEDICAMENTS AND BIOLOGICAL SUBSTANCES: ICD-10-CM

## 2020-02-26 DIAGNOSIS — T78.40XA ALLERGY, UNSPECIFIED, INITIAL ENCOUNTER: ICD-10-CM

## 2020-02-26 DIAGNOSIS — M79.89 OTHER SPECIFIED SOFT TISSUE DISORDERS: ICD-10-CM
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
## 2020-02-26 PROCEDURE — 99284 EMERGENCY DEPT VISIT MOD MDM: CPT

## 2020-02-26 RX ORDER — DIPHENHYDRAMINE HCL 50 MG
50 CAPSULE ORAL ONCE
Refills: 0 | Status: COMPLETED | OUTPATIENT
Start: 2020-02-26 | End: 2020-02-26

## 2020-02-26 RX ORDER — EPINEPHRINE 0.3 MG/.3ML
0.3 INJECTION INTRAMUSCULAR; SUBCUTANEOUS
Qty: 0.3 | Refills: 0
Start: 2020-02-26 | End: 2020-02-26

## 2020-02-26 RX ORDER — LIDOCAINE 4 G/100G
1 CREAM TOPICAL
Qty: 1 | Refills: 0
Start: 2020-02-26 | End: 2020-03-03

## 2020-02-26 RX ORDER — HYDROXYZINE HCL 10 MG
1 TABLET ORAL
Qty: 20 | Refills: 0
Start: 2020-02-26 | End: 2020-03-01

## 2020-02-26 RX ORDER — LIDOCAINE 4 G/100G
1 CREAM TOPICAL ONCE
Refills: 0 | Status: DISCONTINUED | OUTPATIENT
Start: 2020-02-26 | End: 2020-02-26

## 2020-02-26 RX ORDER — DIPHENHYDRAMINE HCL 50 MG
1 CAPSULE ORAL
Qty: 15 | Refills: 0
Start: 2020-02-26 | End: 2020-03-01

## 2020-02-26 RX ORDER — HYDROXYZINE HCL 10 MG
25 TABLET ORAL ONCE
Refills: 0 | Status: COMPLETED | OUTPATIENT
Start: 2020-02-26 | End: 2020-02-26

## 2020-02-26 RX ORDER — SODIUM CHLORIDE 9 MG/ML
1000 INJECTION INTRAMUSCULAR; INTRAVENOUS; SUBCUTANEOUS ONCE
Refills: 0 | Status: COMPLETED | OUTPATIENT
Start: 2020-02-26 | End: 2020-02-26

## 2020-02-26 RX ORDER — FAMOTIDINE 10 MG/ML
1 INJECTION INTRAVENOUS
Qty: 14 | Refills: 0
Start: 2020-02-26 | End: 2020-03-03

## 2020-02-26 RX ORDER — FAMOTIDINE 10 MG/ML
40 INJECTION INTRAVENOUS ONCE
Refills: 0 | Status: COMPLETED | OUTPATIENT
Start: 2020-02-26 | End: 2020-02-26

## 2020-02-26 RX ADMIN — SODIUM CHLORIDE 1000 MILLILITER(S): 9 INJECTION INTRAMUSCULAR; INTRAVENOUS; SUBCUTANEOUS at 17:34

## 2020-02-26 RX ADMIN — FAMOTIDINE 40 MILLIGRAM(S): 10 INJECTION INTRAVENOUS at 16:47

## 2020-02-26 RX ADMIN — Medication 25 MILLIGRAM(S): at 17:55

## 2020-02-26 RX ADMIN — Medication 125 MILLIGRAM(S): at 16:46

## 2020-02-26 RX ADMIN — Medication 50 MILLIGRAM(S): at 16:47

## 2020-02-26 RX ADMIN — Medication 25 MILLIGRAM(S): at 18:55

## 2020-02-26 NOTE — ED PROVIDER NOTE - PHYSICAL EXAMINATION
Vitals:   Gen: AAOx3, NAD, sitting comfortably in stretcher  Head: ncat, perrla, eomi b/l  Neck: supple, no lymphadenopathy, no midline deviation,   Heart: rrr, no m/r/g  Lungs: CTA b/l, no rales/ronchi/wheezes. No retropharyngeal swelling, No uvula deviation.   Abd: soft, nontender, non-distended, no rebound or guarding  Ext: no clubbing/cyanosis/edema  Neuro: sensation and muscle strength intact b/l, steady gait   Skin: Diffuse urticarial rash consistent w/ allergy Vitals: WNL  Gen: AAOx3, NAD, sitting comfortably in stretcher  Head: ncat, perrla, eomi b/l  Neck: supple, no lymphadenopathy, no swelling  ENT: No retropharyngeal swelling, No uvula deviation  Heart: rrr, no m/r/g  Lungs: CTA b/l, no rales/ronchi/wheezes.   Abd: soft, nontender, non-distended, no rebound or guarding  Ext: no clubbing/cyanosis/edema  Neuro: sensation and muscle strength intact b/l, steady gait   Skin: Diffuse urticarial rash consistent w/ allergy

## 2020-02-26 NOTE — ED PROVIDER NOTE - OBJECTIVE STATEMENT
28 y/o female w/ allergy to naproxen presents to ED w/ worsening allergic reaction after taking Sudafed yesterday OTC. Pt states she was unaware that naproxen was in the OTC medicine. Pt reports within 15 minutes she started breaking out in hives to her arms and legs, generalized swelling, itching, and difficulty walking from the swelling that has continued to today. No fevers or respiratory sx. Pt took Benadryl yesterday w/ minimal relief.

## 2020-02-26 NOTE — ED PROVIDER NOTE - NSFOLLOWUPCLINICS_GEN_ALL_ED_FT
[facility]  Rheumatology  [address]  [place] [address]  Phone: [phone]  Fax:   Follow Up Time: 1-3 Days    [facility]  Rheumatology  [address] [place] [address]  Phone: [phone]  Fax: [phone]  Follow Up Time: 1-3 Days

## 2020-02-26 NOTE — ED ADULT NURSE NOTE - NS ED NURSE LEVEL OF CONSCIOUSNESS ORIENTATION
TRANSFER - IN REPORT:    Verbal report received from nayana rn (name) on Cheikh Barclay  being received from PACU(unit) for routine post - op      Report consisted of patients Situation, Background, Assessment and   Recommendations(SBAR). Information from the following report(s) SBAR, Kardex, OR Summary and Intake/Output was reviewed with the receiving nurse. Opportunity for questions and clarification was provided. Assessment completed upon patients arrival to unit and care assumed. Oriented - self; Oriented - place; Oriented - time

## 2020-02-26 NOTE — ED ADULT TRIAGE NOTE - CHIEF COMPLAINT QUOTE
Hives since yesterday started new medication yesterday, unaware of naproxen in new medication that she knew she was allergic to, last dose of Benadryl taken yesterday

## 2020-02-26 NOTE — ED ADULT NURSE NOTE - OBJECTIVE STATEMENT
pt A&Ox4 with c/o Hives since yesterday after starting a new medication, unaware of naproxen in new medication that she knew she was allergic to, last dose of Benadryl taken yesterday. hives noted to body. No SOB  Denies difficulty breathing, No tongue swelling noted.   Denies PMH

## 2020-02-26 NOTE — ED PROVIDER NOTE - CLINICAL SUMMARY MEDICAL DECISION MAKING FREE TEXT BOX
30 y/o female w/ diffuse urticarial rash consistent w/ allergy. Give IV fluids and reassess. 30 y/o female w/ diffuse urticarial rash consistent w/ allergy. Give IV fluids, meds, and reassess

## 2020-02-26 NOTE — ED PROVIDER NOTE - NS ED ROS FT
ROS: negative for fever, cough, headache, chest pain, shortness of breath, abd pain, nausea, vomiting, diarrhea, rash, paresthesia, and weakness--all other systems reviewed are negative.   positive for hives, itching, swelling, and difficulty walking

## 2020-02-26 NOTE — ED PROVIDER NOTE - PROGRESS NOTE DETAILS
rash and swelling improved, itching persists  ordered 2% lido topical, but pharmacy is out  ordered hydroxyzine for additional itch relief Pt. reports feeling better after meds, itching improved with hydroxyzine, pt. requests additional dose, will oblige  pt. agrees to f/u with primary care outpt., rheum f/u also given   pt. understands to return to ED if symptoms worsen; will d/c with meds

## 2020-02-26 NOTE — ED PROVIDER NOTE - PATIENT PORTAL LINK FT
You can access the FollowMyHealth Patient Portal offered by [facility] by registering at the following website: [email or web address]. By joining [employer]’s FollowMyHealth portal, you will also be able to view your health information using other applications (apps) compatible with our system.

## 2020-04-20 NOTE — ED ADULT TRIAGE NOTE - BP NONINVASIVE DIASTOLIC (MM HG)
Patient reports relatively low bp and lighteadedness with addition of doxazosin - seems to be effective.  Will ask MA to inform patient to continue doxazosin but hold hydralazine and spironolactone    Michael Bloch, MD  Vascular Care     61

## 2020-08-20 PROBLEM — R21 RASH OF HANDS: Status: ACTIVE | Noted: 2018-10-04

## 2020-11-09 NOTE — ED ADULT NURSE NOTE - CHIEF COMPLAINT QUOTE
Pt c/o redness to bilateral lower legs and water filled sacs to hands. States " I had food from a restaurant on Monday- 10 minutes after eating my lip swelled up and I had diarrhea.", On Tuesday noted redness to legs and water filled sacs to hands" I went on my trip to [place] and returned today" Pt denies the use of any new skin products. No trouble breathing and skin is clear. [General Appearance - Well Developed] : well developed [General Appearance - Well Nourished] : well nourished [Normal Appearance] : normal appearance [Well Groomed] : well groomed [General Appearance - In No Acute Distress] : no acute distress [Edema] : no peripheral edema [Respiration, Rhythm And Depth] : normal respiratory rhythm and effort [Exaggerated Use Of Accessory Muscles For Inspiration] : no accessory muscle use [Abdomen Soft] : soft [Abdomen Tenderness] : non-tender [Costovertebral Angle Tenderness] : no ~M costovertebral angle tenderness [Urinary Bladder Findings] : the bladder was normal on palpation [Normal Station and Gait] : the gait and station were normal for the patient's age [] : no rash [No Focal Deficits] : no focal deficits [Oriented To Time, Place, And Person] : oriented to person, place, and time [Affect] : the affect was normal [Mood] : the mood was normal [Not Anxious] : not anxious [No Palpable Adenopathy] : no palpable adenopathy

## 2021-01-06 NOTE — ED POST DISCHARGE NOTE - RESULT SUMMARY
UCX: E.coil > 100,000.  Pt discharged on Ceftin 250mg BID x 7 days.  Results prelim.  Follow results to final. no

## 2021-09-17 ENCOUNTER — EMERGENCY (EMERGENCY)
Facility: HOSPITAL | Age: 31
LOS: 0 days | Discharge: HOME | End: 2021-09-17
Attending: STUDENT IN AN ORGANIZED HEALTH CARE EDUCATION/TRAINING PROGRAM
Payer: COMMERCIAL

## 2021-09-17 VITALS
HEART RATE: 77 BPM | DIASTOLIC BLOOD PRESSURE: 81 MMHG | OXYGEN SATURATION: 98 % | SYSTOLIC BLOOD PRESSURE: 133 MMHG | WEIGHT: 160.06 LBS | TEMPERATURE: 98 F | RESPIRATION RATE: 21 BRPM | HEIGHT: 64 IN

## 2021-09-17 VITALS
SYSTOLIC BLOOD PRESSURE: 128 MMHG | OXYGEN SATURATION: 100 % | DIASTOLIC BLOOD PRESSURE: 87 MMHG | TEMPERATURE: 98 F | RESPIRATION RATE: 18 BRPM | HEART RATE: 78 BPM

## 2021-09-17 DIAGNOSIS — Z20.822 CONTACT WITH AND (SUSPECTED) EXPOSURE TO COVID-19: ICD-10-CM

## 2021-09-17 DIAGNOSIS — R06.02 SHORTNESS OF BREATH: ICD-10-CM

## 2021-09-17 DIAGNOSIS — Z88.6 ALLERGY STATUS TO ANALGESIC AGENT: ICD-10-CM

## 2021-09-17 DIAGNOSIS — R07.89 OTHER CHEST PAIN: ICD-10-CM

## 2021-09-17 LAB
FLUAV AG NPH QL: SIGNIFICANT CHANGE UP
FLUBV AG NPH QL: SIGNIFICANT CHANGE UP
SARS-COV-2 RNA SPEC QL NAA+PROBE: SIGNIFICANT CHANGE UP

## 2021-09-17 PROCEDURE — 71045 X-RAY EXAM CHEST 1 VIEW: CPT | Mod: 26

## 2021-09-17 PROCEDURE — 99284 EMERGENCY DEPT VISIT MOD MDM: CPT

## 2021-09-17 PROCEDURE — 93010 ELECTROCARDIOGRAM REPORT: CPT

## 2021-09-17 RX ORDER — ALBUTEROL 90 UG/1
2 AEROSOL, METERED ORAL ONCE
Refills: 0 | Status: COMPLETED | OUTPATIENT
Start: 2021-09-17 | End: 2021-09-17

## 2021-09-17 RX ADMIN — ALBUTEROL 2 PUFF(S): 90 AEROSOL, METERED ORAL at 17:25

## 2021-09-17 NOTE — ED PROVIDER NOTE - PATIENT PORTAL LINK FT
You can access the FollowMyHealth Patient Portal offered by [facility] by registering at the following website: [email or web address]. By joining [employer]’s FollowMyHealth portal, you will also be able to view your health information using other applications (apps) compatible with our system.

## 2021-09-17 NOTE — ED ADULT NURSE NOTE - OBJECTIVE STATEMENT
Pt received in bed 13, A&Ox4 ambulatory. Pt c/o chest tightness and SOB that started today. Denies headaches, n/v/d and fevers. Pt is not vaccinated.

## 2021-09-17 NOTE — ED PROVIDER NOTE - NSICDXPASTMEDICALHX_GEN_ALL_CORE_FT
PAST MEDICAL HISTORY:  No pertinent past medical history     No significant past medical history

## 2021-09-17 NOTE — ED PROVIDER NOTE - CLINICAL SUMMARY MEDICAL DECISION MAKING FREE TEXT BOX
well appearing. no tahycardia, tachypnea or hypoxia. lungs CTAB. perc rules out PE. no risk factors. afebrile, covid/flu/cxr negative. will dc with return precautions and pmd follow up

## 2021-09-17 NOTE — ED PROVIDER NOTE - OBJECTIVE STATEMENT
31f no pmhx feeling like she has "thightness" when trying to take a breath for 1 day. no chest pain. mild sob. no fever or cough. no leg swelling or pain. not on OCPs

## 2022-04-21 ENCOUNTER — TRANSCRIPTION ENCOUNTER (OUTPATIENT)
Age: 32
End: 2022-04-21

## 2022-06-28 ENCOUNTER — NON-APPOINTMENT (OUTPATIENT)
Age: 32
End: 2022-06-28

## 2022-09-06 ENCOUNTER — NON-APPOINTMENT (OUTPATIENT)
Age: 32
End: 2022-09-06

## 2023-08-23 NOTE — ED PROVIDER NOTE - CARDIAC, MLM
Normal rate, regular rhythm.  Heart sounds S1, S2.  No murmurs, rubs or gallops. 0 (no pain/absence of nonverbal indicators of pain)

## 2023-08-24 NOTE — ED ADULT NURSE NOTE - NS PRO PASSIVE SMOKE EXP
[Chaperone Present] : A chaperone was present in the examining room during all aspects of the physical examination [FreeTextEntry1] : Sue Holt PA-C [Absent] : Adnexa(ae): Absent [Normal] : Bimanual Exam: Normal Unknown

## 2023-08-29 NOTE — ED ADULT NURSE NOTE - HIV OFFER
Previously Declined (within the last year)
No wheezing/clear to mild end expiratory wheeze or scattered expiratory wheeze

## 2024-05-22 NOTE — PATIENT PROFILE ADULT - NSPROSPHOSPCHAPLAINYN_GEN_A_NUR
PAST MEDICAL HISTORY:  GERD (gastroesophageal reflux disease)     HLD (hyperlipidemia)      PAST MEDICAL HISTORY:  GERD (gastroesophageal reflux disease)     HLD (hyperlipidemia)     Lung nodule     Prostate cancer      no